# Patient Record
Sex: MALE | Race: WHITE | NOT HISPANIC OR LATINO | Employment: UNEMPLOYED | URBAN - METROPOLITAN AREA
[De-identification: names, ages, dates, MRNs, and addresses within clinical notes are randomized per-mention and may not be internally consistent; named-entity substitution may affect disease eponyms.]

---

## 2023-02-15 PROBLEM — F17.210 CIGARETTE NICOTINE DEPENDENCE WITHOUT COMPLICATION: Status: ACTIVE | Noted: 2023-02-15

## 2023-02-15 PROBLEM — R69 DIAGNOSIS UNKNOWN: Status: RESOLVED | Noted: 2023-02-15 | Resolved: 2023-02-15

## 2023-02-15 PROBLEM — E55.9 VITAMIN D DEFICIENCY: Status: ACTIVE | Noted: 2023-02-15

## 2023-02-15 PROBLEM — Z98.61 S/P PTCA (PERCUTANEOUS TRANSLUMINAL CORONARY ANGIOPLASTY): Status: ACTIVE | Noted: 2023-02-15

## 2023-02-15 PROBLEM — E11.65 TYPE 2 DIABETES MELLITUS WITH HYPERGLYCEMIA, WITHOUT LONG-TERM CURRENT USE OF INSULIN (MULTI): Status: ACTIVE | Noted: 2023-02-15

## 2023-02-15 PROBLEM — E78.5 HYPERLIPIDEMIA: Status: ACTIVE | Noted: 2023-02-15

## 2023-02-15 PROBLEM — I10 HYPERTENSION: Status: ACTIVE | Noted: 2023-02-15

## 2023-02-15 PROBLEM — E11.29 MICROALBUMINURIA DUE TO TYPE 2 DIABETES MELLITUS (MULTI): Status: ACTIVE | Noted: 2023-02-15

## 2023-02-15 PROBLEM — I25.10 CAD IN NATIVE ARTERY: Status: ACTIVE | Noted: 2023-02-15

## 2023-02-15 PROBLEM — R80.9 MICROALBUMINURIA DUE TO TYPE 2 DIABETES MELLITUS (MULTI): Status: ACTIVE | Noted: 2023-02-15

## 2023-02-15 RX ORDER — ASPIRIN 81 MG/1
1 TABLET ORAL DAILY
COMMUNITY
Start: 2019-02-22

## 2023-02-15 RX ORDER — CHOLECALCIFEROL (VITAMIN D3) 125 MCG
1 CAPSULE ORAL DAILY
COMMUNITY
Start: 2021-08-10

## 2023-02-15 RX ORDER — LISINOPRIL 10 MG/1
1 TABLET ORAL DAILY
COMMUNITY
Start: 2018-04-06 | End: 2023-09-05 | Stop reason: SDUPTHER

## 2023-02-15 RX ORDER — METFORMIN HYDROCHLORIDE 500 MG/1
1 TABLET, EXTENDED RELEASE ORAL 2 TIMES DAILY
COMMUNITY
Start: 2021-08-10 | End: 2023-06-06 | Stop reason: SDUPTHER

## 2023-02-15 RX ORDER — ROSUVASTATIN CALCIUM 40 MG/1
1 TABLET, COATED ORAL DAILY
COMMUNITY
Start: 2020-06-25 | End: 2023-07-06 | Stop reason: SDUPTHER

## 2023-02-15 RX ORDER — IBUPROFEN 200 MG
CAPSULE ORAL
COMMUNITY
Start: 2021-08-10 | End: 2023-08-07 | Stop reason: WASHOUT

## 2023-02-15 RX ORDER — METOPROLOL TARTRATE 50 MG/1
1 TABLET ORAL EVERY 12 HOURS
COMMUNITY
Start: 2018-04-06 | End: 2023-07-06 | Stop reason: SDUPTHER

## 2023-06-06 ENCOUNTER — TELEPHONE (OUTPATIENT)
Dept: PRIMARY CARE | Facility: CLINIC | Age: 58
End: 2023-06-06
Payer: COMMERCIAL

## 2023-06-06 DIAGNOSIS — E11.65 TYPE 2 DIABETES MELLITUS WITH HYPERGLYCEMIA, WITHOUT LONG-TERM CURRENT USE OF INSULIN (MULTI): Primary | ICD-10-CM

## 2023-06-06 RX ORDER — METFORMIN HYDROCHLORIDE 500 MG/1
500 TABLET, EXTENDED RELEASE ORAL 2 TIMES DAILY
Qty: 180 TABLET | Refills: 1 | Status: SHIPPED | OUTPATIENT
Start: 2023-06-06 | End: 2023-08-07 | Stop reason: SDUPTHER

## 2023-06-06 NOTE — TELEPHONE ENCOUNTER
Pt called asking for refill on metformin be sent to [facility]. Pt was upset that his pharmacy has requested this for him already and it was not done and that he had to call.

## 2023-06-07 NOTE — TELEPHONE ENCOUNTER
1) eprescribed      2) please explain to him that we do not take requests from the pharmacy because they are oftentimes inaccurate or for medications that have been stopped.

## 2023-07-06 DIAGNOSIS — E11.29 MICROALBUMINURIA DUE TO TYPE 2 DIABETES MELLITUS (MULTI): ICD-10-CM

## 2023-07-06 DIAGNOSIS — E55.9 VITAMIN D DEFICIENCY: ICD-10-CM

## 2023-07-06 DIAGNOSIS — E78.2 MIXED HYPERLIPIDEMIA: Primary | ICD-10-CM

## 2023-07-06 DIAGNOSIS — I25.10 ASHD (ARTERIOSCLEROTIC HEART DISEASE): ICD-10-CM

## 2023-07-06 DIAGNOSIS — I10 ESSENTIAL HYPERTENSION: ICD-10-CM

## 2023-07-06 DIAGNOSIS — R80.9 MICROALBUMINURIA DUE TO TYPE 2 DIABETES MELLITUS (MULTI): ICD-10-CM

## 2023-07-06 DIAGNOSIS — E11.65 TYPE 2 DIABETES MELLITUS WITH HYPERGLYCEMIA, WITHOUT LONG-TERM CURRENT USE OF INSULIN (MULTI): ICD-10-CM

## 2023-07-08 RX ORDER — ROSUVASTATIN CALCIUM 40 MG/1
40 TABLET, COATED ORAL DAILY
Qty: 90 TABLET | Refills: 0 | Status: SHIPPED | OUTPATIENT
Start: 2023-07-08 | End: 2023-10-06 | Stop reason: SDUPTHER

## 2023-07-08 RX ORDER — METOPROLOL TARTRATE 50 MG/1
50 TABLET ORAL EVERY 12 HOURS
Qty: 180 TABLET | Refills: 0 | Status: SHIPPED | OUTPATIENT
Start: 2023-07-08 | End: 2023-10-06 | Stop reason: SDUPTHER

## 2023-07-11 NOTE — TELEPHONE ENCOUNTER
Pt notified of refills, appointment made for August 7th, do you want him to have blood work for this appointment??  He will be using  labs.  Please advise so I can call him back  Last blood work was done last July (full panel blood work,) (A1C October 2022)

## 2023-07-12 PROBLEM — E78.2 MIXED HYPERLIPIDEMIA: Status: ACTIVE | Noted: 2023-02-15

## 2023-08-02 ENCOUNTER — LAB (OUTPATIENT)
Dept: LAB | Facility: LAB | Age: 58
End: 2023-08-02
Payer: COMMERCIAL

## 2023-08-02 DIAGNOSIS — E55.9 VITAMIN D DEFICIENCY: ICD-10-CM

## 2023-08-02 DIAGNOSIS — E11.29 MICROALBUMINURIA DUE TO TYPE 2 DIABETES MELLITUS (MULTI): ICD-10-CM

## 2023-08-02 DIAGNOSIS — I25.10 ASHD (ARTERIOSCLEROTIC HEART DISEASE): ICD-10-CM

## 2023-08-02 DIAGNOSIS — I10 ESSENTIAL HYPERTENSION: ICD-10-CM

## 2023-08-02 DIAGNOSIS — E11.65 TYPE 2 DIABETES MELLITUS WITH HYPERGLYCEMIA, WITHOUT LONG-TERM CURRENT USE OF INSULIN (MULTI): ICD-10-CM

## 2023-08-02 DIAGNOSIS — E78.2 MIXED HYPERLIPIDEMIA: ICD-10-CM

## 2023-08-02 DIAGNOSIS — R80.9 MICROALBUMINURIA DUE TO TYPE 2 DIABETES MELLITUS (MULTI): ICD-10-CM

## 2023-08-02 LAB
ALANINE AMINOTRANSFERASE (SGPT) (U/L) IN SER/PLAS: 39 U/L (ref 10–52)
ALBUMIN (G/DL) IN SER/PLAS: 4 G/DL (ref 3.4–5)
ALBUMIN (MG/L) IN URINE: 136.4 MG/L
ALBUMIN/CREATININE (UG/MG) IN URINE: 78.4 UG/MG CRT (ref 0–30)
ALKALINE PHOSPHATASE (U/L) IN SER/PLAS: 44 U/L (ref 33–120)
ANION GAP IN SER/PLAS: 13 MMOL/L (ref 10–20)
ASPARTATE AMINOTRANSFERASE (SGOT) (U/L) IN SER/PLAS: 27 U/L (ref 9–39)
BASOPHILS (10*3/UL) IN BLOOD BY AUTOMATED COUNT: 0.03 X10E9/L (ref 0–0.1)
BASOPHILS/100 LEUKOCYTES IN BLOOD BY AUTOMATED COUNT: 0.6 % (ref 0–2)
BILIRUBIN TOTAL (MG/DL) IN SER/PLAS: 0.9 MG/DL (ref 0–1.2)
CALCIDIOL (25 OH VITAMIN D3) (NG/ML) IN SER/PLAS: 29 NG/ML
CALCIUM (MG/DL) IN SER/PLAS: 8.9 MG/DL (ref 8.6–10.3)
CARBON DIOXIDE, TOTAL (MMOL/L) IN SER/PLAS: 27 MMOL/L (ref 21–32)
CHLORIDE (MMOL/L) IN SER/PLAS: 102 MMOL/L (ref 98–107)
CHOLESTEROL (MG/DL) IN SER/PLAS: 82 MG/DL (ref 0–199)
CHOLESTEROL IN HDL (MG/DL) IN SER/PLAS: 41.5 MG/DL
CHOLESTEROL/HDL RATIO: 2
COBALAMIN (VITAMIN B12) (PG/ML) IN SER/PLAS: 199 PG/ML (ref 211–911)
CREATININE (MG/DL) IN SER/PLAS: 1.15 MG/DL (ref 0.5–1.3)
CREATININE (MG/DL) IN URINE: 174 MG/DL (ref 20–370)
EOSINOPHILS (10*3/UL) IN BLOOD BY AUTOMATED COUNT: 0.08 X10E9/L (ref 0–0.7)
EOSINOPHILS/100 LEUKOCYTES IN BLOOD BY AUTOMATED COUNT: 1.6 % (ref 0–6)
ERYTHROCYTE DISTRIBUTION WIDTH (RATIO) BY AUTOMATED COUNT: 12.4 % (ref 11.5–14.5)
ERYTHROCYTE MEAN CORPUSCULAR HEMOGLOBIN CONCENTRATION (G/DL) BY AUTOMATED: 33.3 G/DL (ref 32–36)
ERYTHROCYTE MEAN CORPUSCULAR VOLUME (FL) BY AUTOMATED COUNT: 90 FL (ref 80–100)
ERYTHROCYTES (10*6/UL) IN BLOOD BY AUTOMATED COUNT: 4.82 X10E12/L (ref 4.5–5.9)
ESTIMATED AVERAGE GLUCOSE FOR HBA1C: 286 MG/DL
GFR MALE: 73 ML/MIN/1.73M2
GLUCOSE (MG/DL) IN SER/PLAS: 319 MG/DL (ref 74–99)
HEMATOCRIT (%) IN BLOOD BY AUTOMATED COUNT: 43.5 % (ref 41–52)
HEMOGLOBIN (G/DL) IN BLOOD: 14.5 G/DL (ref 13.5–17.5)
HEMOGLOBIN A1C/HEMOGLOBIN TOTAL IN BLOOD: 11.6 %
IMMATURE GRANULOCYTES/100 LEUKOCYTES IN BLOOD BY AUTOMATED COUNT: 0.2 % (ref 0–0.9)
LDL: 15 MG/DL (ref 0–99)
LEUKOCYTES (10*3/UL) IN BLOOD BY AUTOMATED COUNT: 5.2 X10E9/L (ref 4.4–11.3)
LYMPHOCYTES (10*3/UL) IN BLOOD BY AUTOMATED COUNT: 1.95 X10E9/L (ref 1.2–4.8)
LYMPHOCYTES/100 LEUKOCYTES IN BLOOD BY AUTOMATED COUNT: 37.9 % (ref 13–44)
MONOCYTES (10*3/UL) IN BLOOD BY AUTOMATED COUNT: 0.56 X10E9/L (ref 0.1–1)
MONOCYTES/100 LEUKOCYTES IN BLOOD BY AUTOMATED COUNT: 10.9 % (ref 2–10)
NEUTROPHILS (10*3/UL) IN BLOOD BY AUTOMATED COUNT: 2.52 X10E9/L (ref 1.2–7.7)
NEUTROPHILS/100 LEUKOCYTES IN BLOOD BY AUTOMATED COUNT: 48.8 % (ref 40–80)
PLATELETS (10*3/UL) IN BLOOD AUTOMATED COUNT: 207 X10E9/L (ref 150–450)
POTASSIUM (MMOL/L) IN SER/PLAS: 4.8 MMOL/L (ref 3.5–5.3)
PROTEIN TOTAL: 6.5 G/DL (ref 6.4–8.2)
SODIUM (MMOL/L) IN SER/PLAS: 137 MMOL/L (ref 136–145)
THYROTROPIN (MIU/L) IN SER/PLAS BY DETECTION LIMIT <= 0.05 MIU/L: 2.96 MIU/L (ref 0.44–3.98)
TRIGLYCERIDE (MG/DL) IN SER/PLAS: 130 MG/DL (ref 0–149)
UREA NITROGEN (MG/DL) IN SER/PLAS: 15 MG/DL (ref 6–23)
VLDL: 26 MG/DL (ref 0–40)

## 2023-08-02 PROCEDURE — 82607 VITAMIN B-12: CPT

## 2023-08-02 PROCEDURE — 83036 HEMOGLOBIN GLYCOSYLATED A1C: CPT

## 2023-08-02 PROCEDURE — 82570 ASSAY OF URINE CREATININE: CPT

## 2023-08-02 PROCEDURE — 36415 COLL VENOUS BLD VENIPUNCTURE: CPT

## 2023-08-02 PROCEDURE — 80061 LIPID PANEL: CPT

## 2023-08-02 PROCEDURE — 85025 COMPLETE CBC W/AUTO DIFF WBC: CPT

## 2023-08-02 PROCEDURE — 82043 UR ALBUMIN QUANTITATIVE: CPT

## 2023-08-02 PROCEDURE — 84443 ASSAY THYROID STIM HORMONE: CPT

## 2023-08-02 PROCEDURE — 80053 COMPREHEN METABOLIC PANEL: CPT

## 2023-08-02 PROCEDURE — 82306 VITAMIN D 25 HYDROXY: CPT

## 2023-08-07 ENCOUNTER — OFFICE VISIT (OUTPATIENT)
Dept: PRIMARY CARE | Facility: CLINIC | Age: 58
End: 2023-08-07
Payer: COMMERCIAL

## 2023-08-07 VITALS
BODY MASS INDEX: 42.23 KG/M2 | TEMPERATURE: 97 F | WEIGHT: 295 LBS | SYSTOLIC BLOOD PRESSURE: 134 MMHG | DIASTOLIC BLOOD PRESSURE: 80 MMHG | OXYGEN SATURATION: 92 % | RESPIRATION RATE: 16 BRPM | HEART RATE: 61 BPM | HEIGHT: 70 IN

## 2023-08-07 DIAGNOSIS — F17.211 CIGARETTE NICOTINE DEPENDENCE IN REMISSION: ICD-10-CM

## 2023-08-07 DIAGNOSIS — E55.9 VITAMIN D DEFICIENCY: ICD-10-CM

## 2023-08-07 DIAGNOSIS — I10 ESSENTIAL HYPERTENSION: ICD-10-CM

## 2023-08-07 DIAGNOSIS — E11.65 TYPE 2 DIABETES MELLITUS WITH HYPERGLYCEMIA, WITHOUT LONG-TERM CURRENT USE OF INSULIN (MULTI): Primary | ICD-10-CM

## 2023-08-07 DIAGNOSIS — E78.2 MIXED HYPERLIPIDEMIA: ICD-10-CM

## 2023-08-07 DIAGNOSIS — I25.10 ASHD (ARTERIOSCLEROTIC HEART DISEASE): ICD-10-CM

## 2023-08-07 DIAGNOSIS — E53.8 VITAMIN B12 DEFICIENCY: ICD-10-CM

## 2023-08-07 DIAGNOSIS — R80.9 MICROALBUMINURIA DUE TO TYPE 2 DIABETES MELLITUS (MULTI): ICD-10-CM

## 2023-08-07 DIAGNOSIS — E11.29 MICROALBUMINURIA DUE TO TYPE 2 DIABETES MELLITUS (MULTI): ICD-10-CM

## 2023-08-07 PROBLEM — E66.813 CLASS 3 SEVERE OBESITY DUE TO EXCESS CALORIES WITH SERIOUS COMORBIDITY AND BODY MASS INDEX (BMI) OF 40.0 TO 44.9 IN ADULT: Status: ACTIVE | Noted: 2023-08-07

## 2023-08-07 PROBLEM — E66.01 CLASS 3 SEVERE OBESITY DUE TO EXCESS CALORIES WITH SERIOUS COMORBIDITY AND BODY MASS INDEX (BMI) OF 40.0 TO 44.9 IN ADULT (MULTI): Status: ACTIVE | Noted: 2023-08-07

## 2023-08-07 PROCEDURE — 3075F SYST BP GE 130 - 139MM HG: CPT | Performed by: FAMILY MEDICINE

## 2023-08-07 PROCEDURE — 3046F HEMOGLOBIN A1C LEVEL >9.0%: CPT | Performed by: FAMILY MEDICINE

## 2023-08-07 PROCEDURE — 3079F DIAST BP 80-89 MM HG: CPT | Performed by: FAMILY MEDICINE

## 2023-08-07 PROCEDURE — 3008F BODY MASS INDEX DOCD: CPT | Performed by: FAMILY MEDICINE

## 2023-08-07 PROCEDURE — 1036F TOBACCO NON-USER: CPT | Performed by: FAMILY MEDICINE

## 2023-08-07 PROCEDURE — 99214 OFFICE O/P EST MOD 30 MIN: CPT | Performed by: FAMILY MEDICINE

## 2023-08-07 PROCEDURE — 4010F ACE/ARB THERAPY RXD/TAKEN: CPT | Performed by: FAMILY MEDICINE

## 2023-08-07 PROCEDURE — 3066F NEPHROPATHY DOC TX: CPT | Performed by: FAMILY MEDICINE

## 2023-08-07 RX ORDER — METFORMIN HYDROCHLORIDE 500 MG/1
1000 TABLET, EXTENDED RELEASE ORAL 2 TIMES DAILY
Qty: 360 TABLET | Refills: 1 | Status: SHIPPED | OUTPATIENT
Start: 2023-08-07 | End: 2023-09-05 | Stop reason: SDUPTHER

## 2023-08-07 RX ORDER — LANCETS
EACH MISCELLANEOUS
Qty: 100 EACH | Refills: 3 | Status: SHIPPED | OUTPATIENT
Start: 2023-08-07

## 2023-08-07 RX ORDER — IBUPROFEN 200 MG
CAPSULE ORAL
Qty: 100 STRIP | Refills: 3 | Status: SHIPPED | OUTPATIENT
Start: 2023-08-07

## 2023-08-07 RX ORDER — LANOLIN ALCOHOL/MO/W.PET/CERES
1000 CREAM (GRAM) TOPICAL DAILY
Qty: 90 TABLET | Refills: 3
Start: 2023-08-07 | End: 2024-08-06

## 2023-08-07 ASSESSMENT — PATIENT HEALTH QUESTIONNAIRE - PHQ9
1. LITTLE INTEREST OR PLEASURE IN DOING THINGS: NOT AT ALL
2. FEELING DOWN, DEPRESSED OR HOPELESS: NOT AT ALL
SUM OF ALL RESPONSES TO PHQ9 QUESTIONS 1 AND 2: 0

## 2023-08-07 NOTE — ASSESSMENT & PLAN NOTE
- HbA1c much worse at 11.6 (7) (7.3) (6.9) (6.9)  - current regimen: metformin 500 mg twice daily   - he admits to poor diet over the last month at least  - will increase metformin to 1000 mg twice daily   - Encouraged healthy lifestyle, including adequate exercise and high fiber, low fat and low carb diet.

## 2023-08-07 NOTE — PATIENT INSTRUCTIONS
Ayan Richardson ,    Thank you for coming in today. We at [facility] appreciate your trust in our care. If you have any questions or concerns about the care you received today, please do not hesitate to contact us at [phone].    The following instructions were discussed today:    - increase metformin to 1000 mg twice daily   - work on diet and exercise   - start vitamin B12 1000 mcg daily   - Follow up in 3 months.    - Please get blood work done 1-2 weeks prior to your next visit.

## 2023-08-07 NOTE — PROGRESS NOTES
Subjective   Patient ID: Ayan Richardson is a 57 y.o. male who presents for diabetic follow up (Needs lancets filled (has the butterfly on it)).    routine follow up. chronic issues as per assessment and plan.     HbA1c is much worse. He states his diet has been horrible. Has not been exercising. He is taking his metformin.         Lab on 08/02/2023   Component Date Value Ref Range Status    Hemoglobin A1C 08/02/2023 11.6 (A)  % Final         Diagnosis of Diabetes-Adults   Non-Diabetic: < or = 5.6%   Increased risk for developing diabetes: 5.7-6.4%   Diagnostic of diabetes: > or = 6.5%  .       Monitoring of Diabetes                Age (y)     Therapeutic Goal (%)   Adults:          >18           <7.0   Pediatrics:    13-18           <7.5                   7-12           <8.0                   0- 6            7.5-8.5   American Diabetes Association. Diabetes Care 33(S1), Jan 2010.    Estimated Average Glucose 08/02/2023 286  MG/DL Final    Vitamin B-12 08/02/2023 199 (L)  211 - 911 pg/mL Final    Vitamin D, 25-Hydroxy 08/02/2023 29 (A)  ng/mL Final    .  DEFICIENCY:         < 20   NG/ML  INSUFFICIENCY:      20-29  NG/ML  SUFFICIENCY:         NG/ML    THIS ASSAY ACCURATELY QUANTIFIES THE SUM OF  VITAMIN D3, 25-HYDROXY AND VIT D2,25-HYDROXY.    ALBUMIN (MG/L) IN URINE 08/02/2023 136.4  Not Established mg/L Final    Albumin/Creatine Ratio 08/02/2023 78.4 (H)  0.0 - 30.0 ug/mg crt Final    Creatinine, Urine 08/02/2023 174.0  20.0 - 370.0 mg/dL Final    WBC 08/02/2023 5.2  4.4 - 11.3 x10E9/L Final    RBC 08/02/2023 4.82  4.50 - 5.90 x10E12/L Final    Hemoglobin 08/02/2023 14.5  13.5 - 17.5 g/dL Final    Hematocrit 08/02/2023 43.5  41.0 - 52.0 % Final    MCV 08/02/2023 90  80 - 100 fL Final    MCHC 08/02/2023 33.3  32.0 - 36.0 g/dL Final    Platelets 08/02/2023 207  150 - 450 x10E9/L Final    RDW 08/02/2023 12.4  11.5 - 14.5 % Final    Neutrophils % 08/02/2023 48.8  40.0 - 80.0 % Final    Immature Granulocytes %,  Automated 08/02/2023 0.2  0.0 - 0.9 % Final     Immature Granulocyte Count (IG) includes promyelocytes,    myelocytes and metamyelocytes but does not include bands.   Percent differential counts (%) should be interpreted in the   context of the absolute cell counts (cells/L).    Lymphocytes % 08/02/2023 37.9  13.0 - 44.0 % Final    Monocytes % 08/02/2023 10.9  2.0 - 10.0 % Final    Eosinophils % 08/02/2023 1.6  0.0 - 6.0 % Final    Basophils % 08/02/2023 0.6  0.0 - 2.0 % Final    Neutrophils Absolute 08/02/2023 2.52  1.20 - 7.70 x10E9/L Final    Lymphocytes Absolute 08/02/2023 1.95  1.20 - 4.80 x10E9/L Final    Monocytes Absolute 08/02/2023 0.56  0.10 - 1.00 x10E9/L Final    Eosinophils Absolute 08/02/2023 0.08  0.00 - 0.70 x10E9/L Final    Basophils Absolute 08/02/2023 0.03  0.00 - 0.10 x10E9/L Final    Glucose 08/02/2023 319 (H)  74 - 99 mg/dL Final    Sodium 08/02/2023 137  136 - 145 mmol/L Final    Potassium 08/02/2023 4.8  3.5 - 5.3 mmol/L Final    Chloride 08/02/2023 102  98 - 107 mmol/L Final    Bicarbonate 08/02/2023 27  21 - 32 mmol/L Final    Anion Gap 08/02/2023 13  10 - 20 mmol/L Final    Urea Nitrogen 08/02/2023 15  6 - 23 mg/dL Final    Creatinine 08/02/2023 1.15  0.50 - 1.30 mg/dL Final    GFR MALE 08/02/2023 73  >90 mL/min/1.73m2 Final     CALCULATIONS OF ESTIMATED GFR ARE PERFORMED   USING THE 2021 CKD-EPI STUDY REFIT EQUATION   WITHOUT THE RACE VARIABLE FOR THE IDMS-TRACEABLE   CREATININE METHODS.    https://jasn.asnjournals.org/content/early/2021/09/22/ASN.8978818761    Calcium 08/02/2023 8.9  8.6 - 10.3 mg/dL Final    Albumin 08/02/2023 4.0  3.4 - 5.0 g/dL Final    Alkaline Phosphatase 08/02/2023 44  33 - 120 U/L Final    Total Protein 08/02/2023 6.5  6.4 - 8.2 g/dL Final    AST 08/02/2023 27  9 - 39 U/L Final    Total Bilirubin 08/02/2023 0.9  0.0 - 1.2 mg/dL Final    ALT (SGPT) 08/02/2023 39  10 - 52 U/L Final     Patients treated with Sulfasalazine may generate    falsely decreased results for  ALT.    Cholesterol 08/02/2023 82  0 - 199 mg/dL Final    .      AGE      DESIRABLE   BORDERLINE HIGH   HIGH     0-19 Y     0 - 169       170 - 199     >/= 200    20-24 Y     0 - 189       190 - 224     >/= 225         >24 Y     0 - 199       200 - 239     >/= 240   **All ranges are based on fasting samples. Specific   therapeutic targets will vary based on patient-specific   cardiac risk.  .   Pediatric guidelines reference:Pediatrics 2011, 128(S5).   Adult guidelines reference: NCEP ATPIII Guidelines,     QUINN 2001, 258:2486-97  .   Venipuncture immediately after or during the    administration of Metamizole may lead to falsely   low results. Testing should be performed immediately   prior to Metamizole dosing.    HDL 08/02/2023 41.5  mg/dL Final    .      AGE      VERY LOW   LOW     NORMAL    HIGH       0-19 Y       < 35   < 40     40-45     ----    20-24 Y       ----   < 40       >45     ----      >24 Y       ----   < 40     40-60      >60  .    Cholesterol/HDL Ratio 08/02/2023 2.0   Final    REF VALUES  DESIRABLE  < 3.4  HIGH RISK  > 5.0    LDL 08/02/2023 15  0 - 99 mg/dL Final    .                           NEAR      BORD      AGE      DESIRABLE  OPTIMAL    HIGH     HIGH     VERY HIGH     0-19 Y     0 - 109     ---    110-129   >/= 130     ----    20-24 Y     0 - 119     ---    120-159   >/= 160     ----      >24 Y     0 -  99   100-129  130-159   160-189     >/=190  .    VLDL 08/02/2023 26  0 - 40 mg/dL Final    Triglycerides 08/02/2023 130  0 - 149 mg/dL Final    .      AGE      DESIRABLE   BORDERLINE HIGH   HIGH     VERY HIGH   0 D-90 D    19 - 174         ----         ----        ----  91 D- 9 Y     0 -  74        75 -  99     >/= 100      ----    10-19 Y     0 -  89        90 - 129     >/= 130      ----    20-24 Y     0 - 114       115 - 149     >/= 150      ----         >24 Y     0 - 149       150 - 199    200- 499    >/= 500  .   Venipuncture immediately after or during the    administration of  "Metamizole may lead to falsely   low results. Testing should be performed immediately   prior to Metamizole dosing.    TSH 08/02/2023 2.96  0.44 - 3.98 mIU/L Final     TSH testing is performed using different testing    methodology at [facility] than at other    [facility] hospitals. Direct result comparisons should    only be made within the same method.        Review of Systems    Objective   /80   Pulse 61   Temp 36.1 °C (97 °F)   Resp 16   Ht 1.778 m (5' 10\")   Wt 134 kg (295 lb)   SpO2 92%   BMI 42.33 kg/m²     Physical Exam    Assessment/Plan   Problem List Items Addressed This Visit       ASHD (arteriosclerotic heart disease)     - follows with Dr. Li  - on metoprolol, crestor, aspirin         BMI 40.0-44.9, adult (CMS/[facility])     - Encouraged healthy lifestyle, including adequate exercise and high fiber, low fat and low carb diet.          Cigarette nicotine dependence in remission     - discussed LDCT. He wants to hold off at this time and discuss again in the future          Essential hypertension     - blood pressure elevated today on initial check but in normal range on repeat  - current regimen: metoprolol 50 mg twice daily and lisinopril 10 mg daily. Continue          Microalbuminuria due to type 2 diabetes mellitus (CMS/[facility])     - continue lisinopril          Mixed hyperlipidemia     - controlled. Continue rosuvastatin  - recheck August 2024         Type 2 diabetes mellitus with hyperglycemia, without long-term current use of insulin (CMS/[facility]) - Primary     - HbA1c much worse at 11.6 (7) (7.3) (6.9) (6.9)  - current regimen: metformin 500 mg twice daily   - he admits to poor diet over the last month at least  - will increase metformin to 1000 mg twice daily   - Encouraged healthy lifestyle, including adequate exercise and high fiber, low fat and low carb diet.          Relevant Medications    metFORMIN XR (Glucophage-XR) 500 mg 24 hr tablet    blood sugar diagnostic (Blood " Glucose Test) strip    lancets misc    Other Relevant Orders    Hemoglobin A1C    Vitamin B12 deficiency     - start vitamin B12 1000 mcg daily   - recheck levels in 3 months          Relevant Medications    cyanocobalamin (Vitamin B-12) 1,000 mcg tablet    Other Relevant Orders    Vitamin B12    Vitamin D deficiency     - vitamin D borderline at 29  - continue vitamin D 5000 international units  daily

## 2023-08-07 NOTE — ASSESSMENT & PLAN NOTE
- blood pressure elevated today on initial check but in normal range on repeat  - current regimen: metoprolol 50 mg twice daily and lisinopril 10 mg daily. Continue

## 2023-08-26 DIAGNOSIS — I10 ESSENTIAL (PRIMARY) HYPERTENSION: ICD-10-CM

## 2023-08-28 RX ORDER — LISINOPRIL 10 MG/1
10 TABLET ORAL DAILY
Qty: 90 TABLET | OUTPATIENT
Start: 2023-08-28

## 2023-09-05 DIAGNOSIS — E11.65 TYPE 2 DIABETES MELLITUS WITH HYPERGLYCEMIA, WITHOUT LONG-TERM CURRENT USE OF INSULIN (MULTI): ICD-10-CM

## 2023-09-05 DIAGNOSIS — I10 ESSENTIAL HYPERTENSION: Primary | ICD-10-CM

## 2023-09-05 RX ORDER — METFORMIN HYDROCHLORIDE 500 MG/1
1000 TABLET, EXTENDED RELEASE ORAL 2 TIMES DAILY
Qty: 360 TABLET | Refills: 1 | Status: SHIPPED | OUTPATIENT
Start: 2023-09-05 | End: 2024-05-14 | Stop reason: SDUPTHER

## 2023-09-05 RX ORDER — LISINOPRIL 10 MG/1
10 TABLET ORAL DAILY
Qty: 90 TABLET | Refills: 1 | Status: SHIPPED | OUTPATIENT
Start: 2023-09-05 | End: 2024-02-16 | Stop reason: DRUGHIGH

## 2023-10-03 DIAGNOSIS — E78.2 MIXED HYPERLIPIDEMIA: ICD-10-CM

## 2023-10-03 DIAGNOSIS — I10 ESSENTIAL HYPERTENSION: ICD-10-CM

## 2023-10-03 RX ORDER — ROSUVASTATIN CALCIUM 40 MG/1
40 TABLET, COATED ORAL DAILY
Qty: 90 TABLET | Refills: 0 | OUTPATIENT
Start: 2023-10-03

## 2023-10-03 RX ORDER — METOPROLOL TARTRATE 50 MG/1
50 TABLET ORAL EVERY 12 HOURS
Qty: 180 TABLET | Refills: 0 | OUTPATIENT
Start: 2023-10-03

## 2023-10-06 RX ORDER — METOPROLOL TARTRATE 50 MG/1
50 TABLET ORAL EVERY 12 HOURS
Qty: 180 TABLET | Refills: 1 | Status: SHIPPED | OUTPATIENT
Start: 2023-10-06 | End: 2024-04-09 | Stop reason: SDUPTHER

## 2023-10-06 RX ORDER — ROSUVASTATIN CALCIUM 40 MG/1
40 TABLET, COATED ORAL DAILY
Qty: 90 TABLET | Refills: 1 | Status: SHIPPED | OUTPATIENT
Start: 2023-10-06 | End: 2023-10-17 | Stop reason: SDUPTHER

## 2023-10-17 DIAGNOSIS — E78.2 MIXED HYPERLIPIDEMIA: ICD-10-CM

## 2023-10-17 RX ORDER — ROSUVASTATIN CALCIUM 40 MG/1
40 TABLET, COATED ORAL DAILY
Qty: 90 TABLET | Refills: 1 | Status: SHIPPED | OUTPATIENT
Start: 2023-10-17 | End: 2024-05-14 | Stop reason: SDUPTHER

## 2023-10-18 ENCOUNTER — APPOINTMENT (OUTPATIENT)
Dept: PRIMARY CARE | Facility: CLINIC | Age: 58
End: 2023-10-18
Payer: COMMERCIAL

## 2023-10-25 NOTE — PROGRESS NOTES
"Counseling:  The patient was counseled regarding diagnostic results, instructions for management, risk factor reductions, prognosis, patient and family education, impressions, risks and benefits of treatment options and importance of compliance with treatment.      Chief Complaint:   The patient presents today for annual followup of CAD.     History Of Present Illness:    Ayan Richardson is a 58 year old male patient who presents today for annual followup of CAD. His PMH is significant for CAD with h/o NSTEMI s/p PTCA/stent of Cx 04/05/2018 and s/p staged PTCA/stent of PI branch of the RCA 04/12/2018, dyslipidemia, and HTN. Over the past year, the patient states that he has done well from a cardiac standpoint. He denies any CP, chest discomfort or SOB. BP has been stable at home. EKG today is stable with no acute changes. The patient is compliant with his prescribed medications.     Last Recorded Vitals:  Vitals:    10/27/23 1135   BP: 134/90   BP Location: Left arm   Pulse: 70   Weight: 135 kg (298 lb)   Height: 1.778 m (5' 10\")       Past Surgical History:  He has a past surgical history that includes Other surgical history (07/27/2021).      Social History:  He reports that he quit smoking about 21 months ago. His smoking use included cigarettes. He has a 30.00 pack-year smoking history. He has never used smokeless tobacco. He reports current alcohol use of about 14.0 standard drinks of alcohol per week. He reports that he does not use drugs.    Family History:  Family History   Problem Relation Name Age of Onset    COPD Mother      Coronary artery disease Mother      Diabetes type II Father      Coronary artery disease Father      Lupus Daughter          Allergies:  Atorvastatin    Outpatient Medications:  Current Outpatient Medications   Medication Instructions    aspirin (Aspir-Low) 81 mg EC tablet 1 tablet, oral, Daily    blood sugar diagnostic (Blood Glucose Test) strip Check blood sugars once daily    " cholecalciferol (Vitamin D-3) 125 MCG (5000 UT) capsule 1 capsule, oral, Daily    cyanocobalamin (VITAMIN B-12) 1,000 mcg, oral, Daily    Dexcom G4 platinum transmitter device subcutaneous, As needed, Use as instructed    lancets misc Check blood sugars once daily    lisinopril 10 mg, oral, Daily    metFORMIN XR (GLUCOPHAGE-XR) 1,000 mg, oral, 2 times daily    metoprolol tartrate (LOPRESSOR) 50 mg, oral, Every 12 hours    OneTouch Delica Plus Lancet 30 gauge misc Daily,  USE TO CHECK BLOOD SUGARS ONCE DAILY<BR>    rosuvastatin (CRESTOR) 40 mg, oral, Daily     Review of Systems   All other systems reviewed and are negative.     Physical Exam:  Constitutional:       Appearance: Healthy appearance. Not in distress.   Neck:      Vascular: No JVR. JVD normal.   Pulmonary:      Effort: Pulmonary effort is normal.      Breath sounds: Normal breath sounds. No wheezing. No rhonchi. No rales.   Chest:      Chest wall: Not tender to palpatation.   Cardiovascular:      PMI at left midclavicular line. Normal rate. Regular rhythm. Normal S1. Normal S2.       Murmurs: There is no murmur.      No gallop.  No click. No rub.   Pulses:     Intact distal pulses.   Edema:     Peripheral edema absent.   Abdominal:      General: Bowel sounds are normal.      Palpations: Abdomen is soft.      Tenderness: There is no abdominal tenderness.   Musculoskeletal: Normal range of motion.         General: No tenderness. Skin:     General: Skin is warm and dry.   Neurological:      General: No focal deficit present.      Mental Status: Alert and oriented to person, place and time.          Last Labs:  CBC -  Lab Results   Component Value Date    WBC 5.2 08/02/2023    HGB 14.5 08/02/2023    HCT 43.5 08/02/2023    MCV 90 08/02/2023     08/02/2023       CMP -  Lab Results   Component Value Date    CALCIUM 8.9 08/02/2023    PROT 6.5 08/02/2023    ALBUMIN 4.0 08/02/2023    AST 27 08/02/2023    ALT 39 08/02/2023    ALKPHOS 44 08/02/2023    BILITOT  0.9 08/02/2023       LIPID PANEL -   Lab Results   Component Value Date    CHOL 82 08/02/2023    TRIG 130 08/02/2023    HDL 41.5 08/02/2023    CHHDL 2.0 08/02/2023    LDLF 15 08/02/2023    VLDL 26 08/02/2023       RENAL FUNCTION PANEL -   Lab Results   Component Value Date    GLUCOSE 319 (H) 08/02/2023     08/02/2023    K 4.8 08/02/2023     08/02/2023    CO2 27 08/02/2023    ANIONGAP 13 08/02/2023    BUN 15 08/02/2023    CREATININE 1.15 08/02/2023    GFRMALE 73 08/02/2023    CALCIUM 8.9 08/02/2023    ALBUMIN 4.0 08/02/2023        Lab Results   Component Value Date    HGBA1C 11.6 (A) 08/02/2023       Last Cardiology Tests:  07/14/2021 - Cardiac Stress Test  1. No clinical or electrocardiographic evidence for ischemia at maximal workload.  2. The adequate level of stress was achieved.     05/17/2018 - Echocardiogram   1. Normal left ventricular size and regional systolic function with an ejection fraction of 55%.  2. Dilated right ventricle with mild systolic dysfunction.   3. Trace tricuspid regurgitation.     04/05/2018 - Cardiac Catheterization (LH)  1. Double vessel coronary artery disease without proximal left anterior descending involvement.  2. The right posterolateral branch showed severe atherosclerotic disease.  3. Successful PTCA/stent of Cx.     Lab review: I have personally reviewed the laboratory result(s).    Assessment/Plan   1) CAD s/p PCI of Cx and RCA  On ASA, lisinopril 10 mg daily, metoprolol 50 mg BID, rosuvastatin 40 mg daily   Lipid panel 08/02/2023 with LDL of 15  Doing well - denies CP, chest discomfort or SOB  EKG stable     2) Smoking  Counselled about risks of smoking including worsening of CAD, HTN and Lung disease. D/w patient about options of quitting smoking including Wellbutrin, Nicotine Patch or gum and Chantix.         Scribe Attestation  By signing my name below, IMana Scribe   attest that this documentation has been prepared under the direction and in  the presence of Justin Li MD.

## 2023-10-26 PROBLEM — I25.10 CAD IN NATIVE ARTERY: Status: ACTIVE | Noted: 2023-10-26

## 2023-10-26 RX ORDER — LANCETS 33 GAUGE
EACH MISCELLANEOUS DAILY
COMMUNITY
Start: 2023-08-07

## 2023-10-27 ENCOUNTER — OFFICE VISIT (OUTPATIENT)
Dept: CARDIOLOGY | Facility: CLINIC | Age: 58
End: 2023-10-27
Payer: COMMERCIAL

## 2023-10-27 VITALS
HEIGHT: 70 IN | SYSTOLIC BLOOD PRESSURE: 134 MMHG | DIASTOLIC BLOOD PRESSURE: 90 MMHG | BODY MASS INDEX: 42.66 KG/M2 | WEIGHT: 298 LBS | HEART RATE: 70 BPM

## 2023-10-27 DIAGNOSIS — I25.10 CAD IN NATIVE ARTERY: Primary | ICD-10-CM

## 2023-10-27 PROCEDURE — 3046F HEMOGLOBIN A1C LEVEL >9.0%: CPT | Performed by: INTERNAL MEDICINE

## 2023-10-27 PROCEDURE — 1036F TOBACCO NON-USER: CPT | Performed by: INTERNAL MEDICINE

## 2023-10-27 PROCEDURE — 3080F DIAST BP >= 90 MM HG: CPT | Performed by: INTERNAL MEDICINE

## 2023-10-27 PROCEDURE — 3075F SYST BP GE 130 - 139MM HG: CPT | Performed by: INTERNAL MEDICINE

## 2023-10-27 PROCEDURE — 93000 ELECTROCARDIOGRAM COMPLETE: CPT | Performed by: INTERNAL MEDICINE

## 2023-10-27 PROCEDURE — 4010F ACE/ARB THERAPY RXD/TAKEN: CPT | Performed by: INTERNAL MEDICINE

## 2023-10-27 PROCEDURE — 3066F NEPHROPATHY DOC TX: CPT | Performed by: INTERNAL MEDICINE

## 2023-10-27 PROCEDURE — 99212 OFFICE O/P EST SF 10 MIN: CPT | Performed by: INTERNAL MEDICINE

## 2023-10-27 PROCEDURE — 3008F BODY MASS INDEX DOCD: CPT | Performed by: INTERNAL MEDICINE

## 2023-10-27 ASSESSMENT — ENCOUNTER SYMPTOMS
LOSS OF SENSATION IN FEET: 0
DEPRESSION: 0
OCCASIONAL FEELINGS OF UNSTEADINESS: 0

## 2023-10-27 NOTE — LETTER
"October 27, 2023     Patricia Roche MD  [address] [facility] [address]    Patient: Ayan Richardson   YOB: 1965   Date of Visit: 10/27/2023       Dear Dr. Patricia Roche MD:    Thank you for referring Ayan Richardson to me for evaluation. Below are my notes for this consultation.  If you have questions, please do not hesitate to call me. I look forward to following your patient along with you.       Sincerely,     Justin Li MD      CC: No Recipients  ______________________________________________________________________________________    Counseling:  The patient was counseled regarding diagnostic results, instructions for management, risk factor reductions, prognosis, patient and family education, impressions, risks and benefits of treatment options and importance of compliance with treatment.      Chief Complaint:   The patient presents today for annual followup of CAD.     History Of Present Illness:    Ayan Richardson is a 58 year old male patient who presents today for annual followup of CAD. His PMH is significant for CAD with h/o NSTEMI s/p PTCA/stent of Cx 04/05/2018 and s/p staged PTCA/stent of PI branch of the RCA 04/12/2018, dyslipidemia, and HTN. Over the past year, the patient states that he has done well from a cardiac standpoint. He denies any CP, chest discomfort or SOB. BP has been stable at home. EKG today is stable with no acute changes. The patient is compliant with his prescribed medications.     Last Recorded Vitals:  Vitals:    10/27/23 1135   BP: 134/90   BP Location: Left arm   Pulse: 70   Weight: 135 kg (298 lb)   Height: 1.778 m (5' 10\")       Past Surgical History:  He has a past surgical history that includes Other surgical history (07/27/2021).      Social History:  He reports that he quit smoking about 21 months ago. His smoking use included cigarettes. He has a 30.00 pack-year smoking history. He has never used smokeless tobacco. He reports current alcohol use of about 14.0 " standard drinks of alcohol per week. He reports that he does not use drugs.    Family History:  Family History   Problem Relation Name Age of Onset   • COPD Mother     • Coronary artery disease Mother     • Diabetes type II Father     • Coronary artery disease Father     • Lupus Daughter          Allergies:  Atorvastatin    Outpatient Medications:  Current Outpatient Medications   Medication Instructions   • aspirin (Aspir-Low) 81 mg EC tablet 1 tablet, oral, Daily   • blood sugar diagnostic (Blood Glucose Test) strip Check blood sugars once daily   • cholecalciferol (Vitamin D-3) 125 MCG (5000 UT) capsule 1 capsule, oral, Daily   • cyanocobalamin (VITAMIN B-12) 1,000 mcg, oral, Daily   • Dexcom G4 platinum transmitter device subcutaneous, As needed, Use as instructed   • lancets misc Check blood sugars once daily   • lisinopril 10 mg, oral, Daily   • metFORMIN XR (GLUCOPHAGE-XR) 1,000 mg, oral, 2 times daily   • metoprolol tartrate (LOPRESSOR) 50 mg, oral, Every 12 hours   • OneTouch Delica Plus Lancet 30 gauge misc Daily,  USE TO CHECK BLOOD SUGARS ONCE DAILY<BR>   • rosuvastatin (CRESTOR) 40 mg, oral, Daily     Review of Systems   All other systems reviewed and are negative.     Physical Exam:  Constitutional:       Appearance: Healthy appearance. Not in distress.   Neck:      Vascular: No JVR. JVD normal.   Pulmonary:      Effort: Pulmonary effort is normal.      Breath sounds: Normal breath sounds. No wheezing. No rhonchi. No rales.   Chest:      Chest wall: Not tender to palpatation.   Cardiovascular:      PMI at left midclavicular line. Normal rate. Regular rhythm. Normal S1. Normal S2.       Murmurs: There is no murmur.      No gallop.  No click. No rub.   Pulses:     Intact distal pulses.   Edema:     Peripheral edema absent.   Abdominal:      General: Bowel sounds are normal.      Palpations: Abdomen is soft.      Tenderness: There is no abdominal tenderness.   Musculoskeletal: Normal range of motion.          General: No tenderness. Skin:     General: Skin is warm and dry.   Neurological:      General: No focal deficit present.      Mental Status: Alert and oriented to person, place and time.          Last Labs:  CBC -  Lab Results   Component Value Date    WBC 5.2 08/02/2023    HGB 14.5 08/02/2023    HCT 43.5 08/02/2023    MCV 90 08/02/2023     08/02/2023       CMP -  Lab Results   Component Value Date    CALCIUM 8.9 08/02/2023    PROT 6.5 08/02/2023    ALBUMIN 4.0 08/02/2023    AST 27 08/02/2023    ALT 39 08/02/2023    ALKPHOS 44 08/02/2023    BILITOT 0.9 08/02/2023       LIPID PANEL -   Lab Results   Component Value Date    CHOL 82 08/02/2023    TRIG 130 08/02/2023    HDL 41.5 08/02/2023    CHHDL 2.0 08/02/2023    LDLF 15 08/02/2023    VLDL 26 08/02/2023       RENAL FUNCTION PANEL -   Lab Results   Component Value Date    GLUCOSE 319 (H) 08/02/2023     08/02/2023    K 4.8 08/02/2023     08/02/2023    CO2 27 08/02/2023    ANIONGAP 13 08/02/2023    BUN 15 08/02/2023    CREATININE 1.15 08/02/2023    GFRMALE 73 08/02/2023    CALCIUM 8.9 08/02/2023    ALBUMIN 4.0 08/02/2023        Lab Results   Component Value Date    HGBA1C 11.6 (A) 08/02/2023       Last Cardiology Tests:  07/14/2021 - Cardiac Stress Test  1. No clinical or electrocardiographic evidence for ischemia at maximal workload.  2. The adequate level of stress was achieved.     05/17/2018 - Echocardiogram   1. Normal left ventricular size and regional systolic function with an ejection fraction of 55%.  2. Dilated right ventricle with mild systolic dysfunction.   3. Trace tricuspid regurgitation.     04/05/2018 - Cardiac Catheterization (LH)  1. Double vessel coronary artery disease without proximal left anterior descending involvement.  2. The right posterolateral branch showed severe atherosclerotic disease.  3. Successful PTCA/stent of Cx.     Lab review: I have personally reviewed the laboratory result(s).    Assessment/Plan  1) CAD  s/p PCI of Cx and RCA  On ASA, lisinopril 10 mg daily, metoprolol 50 mg BID, rosuvastatin 40 mg daily   Lipid panel 08/02/2023 with LDL of 15  Doing well - denies CP, chest discomfort or SOB  EKG stable     2) Smoking  Counselled about risks of smoking including worsening of CAD, HTN and Lung disease. D/w patient about options of quitting smoking including Wellbutrin, Nicotine Patch or gum and Chantix.         Scribe Attestation  By signing my name below, I, Flaquita Pelayo   attest that this documentation has been prepared under the direction and in the presence of Justin Li MD.

## 2023-10-27 NOTE — PATIENT INSTRUCTIONS
Continue all current medications as prescribed. s  Followup with Dr. Li in 1 year, sooner should any issues or concerns arise before then.     If you have any questions or cardiac concerns, please call our office at [phone].

## 2023-11-03 ENCOUNTER — LAB (OUTPATIENT)
Dept: LAB | Facility: LAB | Age: 58
End: 2023-11-03
Payer: COMMERCIAL

## 2023-11-03 DIAGNOSIS — E11.65 TYPE 2 DIABETES MELLITUS WITH HYPERGLYCEMIA, WITHOUT LONG-TERM CURRENT USE OF INSULIN (MULTI): ICD-10-CM

## 2023-11-03 DIAGNOSIS — E53.8 VITAMIN B12 DEFICIENCY: ICD-10-CM

## 2023-11-03 LAB
EST. AVERAGE GLUCOSE BLD GHB EST-MCNC: 206 MG/DL
HBA1C MFR BLD: 8.8 %
VIT B12 SERPL-MCNC: 1865 PG/ML (ref 211–911)

## 2023-11-03 PROCEDURE — 36415 COLL VENOUS BLD VENIPUNCTURE: CPT

## 2023-11-03 PROCEDURE — 82607 VITAMIN B-12: CPT

## 2023-11-03 PROCEDURE — 83036 HEMOGLOBIN GLYCOSYLATED A1C: CPT

## 2023-11-08 ENCOUNTER — APPOINTMENT (OUTPATIENT)
Dept: PRIMARY CARE | Facility: CLINIC | Age: 58
End: 2023-11-08
Payer: COMMERCIAL

## 2023-11-10 ENCOUNTER — TELEMEDICINE (OUTPATIENT)
Dept: PRIMARY CARE | Facility: CLINIC | Age: 58
End: 2023-11-10
Payer: COMMERCIAL

## 2023-11-10 DIAGNOSIS — E78.2 MIXED HYPERLIPIDEMIA: ICD-10-CM

## 2023-11-10 DIAGNOSIS — E66.01 CLASS 3 SEVERE OBESITY DUE TO EXCESS CALORIES WITH SERIOUS COMORBIDITY AND BODY MASS INDEX (BMI) OF 40.0 TO 44.9 IN ADULT (MULTI): ICD-10-CM

## 2023-11-10 DIAGNOSIS — E11.65 TYPE 2 DIABETES MELLITUS WITH HYPERGLYCEMIA, WITHOUT LONG-TERM CURRENT USE OF INSULIN (MULTI): Primary | ICD-10-CM

## 2023-11-10 DIAGNOSIS — E11.29 MICROALBUMINURIA DUE TO TYPE 2 DIABETES MELLITUS (MULTI): ICD-10-CM

## 2023-11-10 DIAGNOSIS — I10 ESSENTIAL HYPERTENSION: ICD-10-CM

## 2023-11-10 DIAGNOSIS — I25.10 ASHD (ARTERIOSCLEROTIC HEART DISEASE): ICD-10-CM

## 2023-11-10 DIAGNOSIS — E53.8 VITAMIN B12 DEFICIENCY: ICD-10-CM

## 2023-11-10 DIAGNOSIS — R80.9 MICROALBUMINURIA DUE TO TYPE 2 DIABETES MELLITUS (MULTI): ICD-10-CM

## 2023-11-10 PROCEDURE — 99442 PR PHYS/QHP TELEPHONE EVALUATION 11-20 MIN: CPT | Performed by: FAMILY MEDICINE

## 2023-11-10 RX ORDER — ORAL SEMAGLUTIDE 7 MG/1
7 TABLET ORAL DAILY
Qty: 30 TABLET | Refills: 2 | Status: SHIPPED | OUTPATIENT
Start: 2023-12-08 | End: 2023-12-13 | Stop reason: SDUPTHER

## 2023-11-10 ASSESSMENT — ENCOUNTER SYMPTOMS
HEMATURIA: 0
NAUSEA: 0
CONFUSION: 0
FREQUENCY: 0
DIARRHEA: 0
UNEXPECTED WEIGHT CHANGE: 0
DYSPHORIC MOOD: 0
POLYDIPSIA: 0
CHILLS: 0
DYSURIA: 0
SORE THROAT: 0
NERVOUS/ANXIOUS: 0
ABDOMINAL PAIN: 0
SINUS PAIN: 0
SINUS PRESSURE: 0
DIAPHORESIS: 0
ADENOPATHY: 0
SHORTNESS OF BREATH: 0
FEVER: 0
LIGHT-HEADEDNESS: 0
COUGH: 0
PALPITATIONS: 0
DIZZINESS: 0
CHEST TIGHTNESS: 0
CONSTIPATION: 0
NUMBNESS: 0
POLYPHAGIA: 0
WHEEZING: 0
VOMITING: 0
HEADACHES: 0

## 2023-11-10 NOTE — ASSESSMENT & PLAN NOTE
- HbA1c improved at 8.8 (11.6) (7) (7.3) (6.9) (6.9)  - current regimen: metformin 1000 mg twice daily   - add rybelsus 3 mg x 1 month and then increase to 7 mg daily

## 2023-11-10 NOTE — PROGRESS NOTES
Subjective   Patient ID: Ayan Richardson is a 58 y.o. male who presents for Follow-up.    Virtual or Telephone Consent    A telephone visit (audio only) between the patient (at the originating site) and the provider (at the distant site) was utilized to provide this telehealth service.   Verbal consent was requested and obtained from Ayan Richardson on this date, 11/10/23 for a telehealth visit.      HPI    routine follow up. chronic issues as per assessment and plan.     Saw Dr. Li 10/27/23. Note reviewed. No changes made.     Lab on 11/03/2023   Component Date Value Ref Range Status    Hemoglobin A1C 11/03/2023 8.8 (H)  see below % Final    Estimated Average Glucose 11/03/2023 206  Not Established mg/dL Final    Vitamin B12 11/03/2023 1,865 (H)  211 - 911 pg/mL Final   ;a    Review of Systems   Constitutional:  Negative for chills, diaphoresis, fever and unexpected weight change.   HENT:  Negative for congestion, sinus pressure, sinus pain, sneezing and sore throat.    Respiratory:  Negative for cough, chest tightness, shortness of breath and wheezing.    Cardiovascular:  Negative for chest pain, palpitations and leg swelling.   Gastrointestinal:  Negative for abdominal pain, constipation, diarrhea, nausea and vomiting.   Endocrine: Negative for cold intolerance, heat intolerance, polydipsia, polyphagia and polyuria.   Genitourinary:  Negative for dysuria, frequency, hematuria and urgency.   Neurological:  Negative for dizziness, syncope, light-headedness, numbness and headaches.   Hematological:  Negative for adenopathy.   Psychiatric/Behavioral:  Negative for confusion and dysphoric mood. The patient is not nervous/anxious.          Assessment/Plan   Problem List Items Addressed This Visit       ASHD (arteriosclerotic heart disease)     - follows with Dr. Li  - on metoprolol, crestor, aspirin         BMI 40.0-44.9, adult (CMS/[facility])     - Encouraged healthy lifestyle, including adequate exercise and high fiber,  low fat and low carb diet.          Class 3 severe obesity due to excess calories with serious comorbidity and body mass index (BMI) of 40.0 to 44.9 in adult (CMS/[facility])     - Encouraged healthy lifestyle, including adequate exercise and high fiber, low fat and low carb diet.          Essential hypertension     - controlled. Continue lisinopril and metoprolol         Microalbuminuria due to type 2 diabetes mellitus (CMS/[facility])     - continue lisinopril          Mixed hyperlipidemia     - controlled. Continue rosuvastatin  - recheck August 2024         Type 2 diabetes mellitus with hyperglycemia, without long-term current use of insulin (CMS/[facility]) - Primary     - HbA1c improved at 8.8 (11.6) (7) (7.3) (6.9) (6.9)  - current regimen: metformin 1000 mg twice daily   - add rybelsus 3 mg x 1 month and then increase to 7 mg daily          Vitamin B12 deficiency     - continue vitamin B12            This telephone visit lasted approximately 11 minutes.

## 2023-11-10 NOTE — PATIENT INSTRUCTIONS
Ayan Richardson ,    Thank you for coming in today. We at [facility] appreciate your trust in our care. If you have any questions or concerns about the care you received today, please do not hesitate to contact us at [phone].    The following instructions were discussed today:    - start rybelsus 3 mg daily x 1 month and then increase to 7 mg daily   - Follow up in 3 months.    - Please get blood work done 1-2 weeks prior to your next visit.

## 2023-12-13 DIAGNOSIS — E11.65 TYPE 2 DIABETES MELLITUS WITH HYPERGLYCEMIA, WITHOUT LONG-TERM CURRENT USE OF INSULIN (MULTI): ICD-10-CM

## 2023-12-14 RX ORDER — ORAL SEMAGLUTIDE 7 MG/1
7 TABLET ORAL DAILY
Qty: 30 TABLET | Refills: 2 | Status: SHIPPED | OUTPATIENT
Start: 2023-12-14 | End: 2024-02-13 | Stop reason: DRUGHIGH

## 2024-01-04 ENCOUNTER — TELEPHONE (OUTPATIENT)
Dept: PRIMARY CARE | Facility: CLINIC | Age: 59
End: 2024-01-04
Payer: COMMERCIAL

## 2024-01-04 NOTE — TELEPHONE ENCOUNTER
Tested positive for COVID-19 today. Symptoms started 4 days ago. Only having runny nose. Discussed paxlovid. He declines. States he is starting to feel better.

## 2024-02-06 ENCOUNTER — LAB (OUTPATIENT)
Dept: LAB | Facility: LAB | Age: 59
End: 2024-02-06
Payer: COMMERCIAL

## 2024-02-06 DIAGNOSIS — E11.65 TYPE 2 DIABETES MELLITUS WITH HYPERGLYCEMIA, WITHOUT LONG-TERM CURRENT USE OF INSULIN (MULTI): ICD-10-CM

## 2024-02-06 LAB
EST. AVERAGE GLUCOSE BLD GHB EST-MCNC: 157 MG/DL
HBA1C MFR BLD: 7.1 %

## 2024-02-06 PROCEDURE — 36415 COLL VENOUS BLD VENIPUNCTURE: CPT

## 2024-02-06 PROCEDURE — 83036 HEMOGLOBIN GLYCOSYLATED A1C: CPT

## 2024-02-13 ENCOUNTER — OFFICE VISIT (OUTPATIENT)
Dept: PRIMARY CARE | Facility: CLINIC | Age: 59
End: 2024-02-13
Payer: COMMERCIAL

## 2024-02-13 VITALS
HEIGHT: 70 IN | SYSTOLIC BLOOD PRESSURE: 150 MMHG | HEART RATE: 67 BPM | WEIGHT: 286 LBS | OXYGEN SATURATION: 93 % | RESPIRATION RATE: 16 BRPM | DIASTOLIC BLOOD PRESSURE: 90 MMHG | BODY MASS INDEX: 40.94 KG/M2

## 2024-02-13 DIAGNOSIS — E55.9 VITAMIN D DEFICIENCY: ICD-10-CM

## 2024-02-13 DIAGNOSIS — R80.9 MICROALBUMINURIA DUE TO TYPE 2 DIABETES MELLITUS (MULTI): ICD-10-CM

## 2024-02-13 DIAGNOSIS — I10 ESSENTIAL HYPERTENSION: ICD-10-CM

## 2024-02-13 DIAGNOSIS — E78.2 MIXED HYPERLIPIDEMIA: ICD-10-CM

## 2024-02-13 DIAGNOSIS — E11.29 MICROALBUMINURIA DUE TO TYPE 2 DIABETES MELLITUS (MULTI): ICD-10-CM

## 2024-02-13 DIAGNOSIS — E66.01 CLASS 3 SEVERE OBESITY DUE TO EXCESS CALORIES WITH SERIOUS COMORBIDITY AND BODY MASS INDEX (BMI) OF 40.0 TO 44.9 IN ADULT (MULTI): ICD-10-CM

## 2024-02-13 DIAGNOSIS — E11.65 TYPE 2 DIABETES MELLITUS WITH HYPERGLYCEMIA, WITHOUT LONG-TERM CURRENT USE OF INSULIN (MULTI): Primary | ICD-10-CM

## 2024-02-13 DIAGNOSIS — I25.10 ASHD (ARTERIOSCLEROTIC HEART DISEASE): ICD-10-CM

## 2024-02-13 PROCEDURE — 3077F SYST BP >= 140 MM HG: CPT | Performed by: FAMILY MEDICINE

## 2024-02-13 PROCEDURE — 3051F HG A1C>EQUAL 7.0%<8.0%: CPT | Performed by: FAMILY MEDICINE

## 2024-02-13 PROCEDURE — 1036F TOBACCO NON-USER: CPT | Performed by: FAMILY MEDICINE

## 2024-02-13 PROCEDURE — 3008F BODY MASS INDEX DOCD: CPT | Performed by: FAMILY MEDICINE

## 2024-02-13 PROCEDURE — 99214 OFFICE O/P EST MOD 30 MIN: CPT | Performed by: FAMILY MEDICINE

## 2024-02-13 PROCEDURE — 3080F DIAST BP >= 90 MM HG: CPT | Performed by: FAMILY MEDICINE

## 2024-02-13 PROCEDURE — 4010F ACE/ARB THERAPY RXD/TAKEN: CPT | Performed by: FAMILY MEDICINE

## 2024-02-13 ASSESSMENT — ENCOUNTER SYMPTOMS
DIARRHEA: 0
SINUS PAIN: 0
NAUSEA: 0
UNEXPECTED WEIGHT CHANGE: 0
DYSURIA: 0
CHEST TIGHTNESS: 0
POLYPHAGIA: 0
PALPITATIONS: 0
CONSTIPATION: 0
DIZZINESS: 0
WHEEZING: 0
DIAPHORESIS: 0
SINUS PRESSURE: 0
SHORTNESS OF BREATH: 0
ADENOPATHY: 0
DYSPHORIC MOOD: 0
HEMATURIA: 0
NUMBNESS: 0
ABDOMINAL PAIN: 0
FEVER: 0
CONFUSION: 0
NERVOUS/ANXIOUS: 0
CHILLS: 0
VOMITING: 0
FREQUENCY: 0
SORE THROAT: 0
POLYDIPSIA: 0
COUGH: 0
HEADACHES: 0
LIGHT-HEADEDNESS: 0

## 2024-02-13 NOTE — PATIENT INSTRUCTIONS
Ayan Richardson ,    Thank you for coming in today. We at [facility] appreciate your trust in our care. If you have any questions or concerns about the care you received today, please do not hesitate to contact us at [phone].    The following instructions were discussed today:    - INCREASE rybelsus to 14 mg daily   - monitor blood pressure at home and call with results in 2 weeks   - Follow up in 3 months.

## 2024-02-13 NOTE — ASSESSMENT & PLAN NOTE
- blood pressure high today. Normally runs 130s/80s but today 150/90. Denies chest pain and shortness of breath.    - current regimen: lisinopril 10 mg daily, metoprolol tartate 50 mg twice daily  - will have him monitor blood pressure at home over the next 2 weeks and call with results

## 2024-02-13 NOTE — ASSESSMENT & PLAN NOTE
- HbA1c improved at 7.1 (8.8) (11.6) (7) (7.3) (6.9) (6.9)  - current regimen: metformin 1000 mg twice daily and rybelsus 7 mg daily   - INCREASE rybelsus to 14 mg daily

## 2024-02-13 NOTE — PROGRESS NOTES
"Subjective   Patient ID: Ayan Richardson is a 58 y.o. male who presents for A1C follow up.    [facility]   routine follow up. chronic issues as per assessment and plan.     Saw Dr. Li in Oct. 2023. Note reviewed. This was follow up on coronary artery disease. No changes made.     Review of Systems   Constitutional:  Negative for chills, diaphoresis, fever and unexpected weight change.   HENT:  Negative for congestion, sinus pressure, sinus pain, sneezing and sore throat.    Respiratory:  Negative for cough, chest tightness, shortness of breath and wheezing.    Cardiovascular:  Negative for chest pain, palpitations and leg swelling.   Gastrointestinal:  Negative for abdominal pain, constipation, diarrhea, nausea and vomiting.   Endocrine: Negative for cold intolerance, heat intolerance, polydipsia, polyphagia and polyuria.   Genitourinary:  Negative for dysuria, frequency, hematuria and urgency.   Neurological:  Negative for dizziness, syncope, light-headedness, numbness and headaches.   Hematological:  Negative for adenopathy.   Psychiatric/Behavioral:  Negative for confusion and dysphoric mood. The patient is not nervous/anxious.        Objective   /90 (BP Location: Left arm, Patient Position: Sitting, BP Cuff Size: Adult) Comment (BP Location): forearm  Pulse 67   Resp 16   Ht 1.778 m (5' 10\")   Wt 130 kg (286 lb)   SpO2 93%   BMI 41.04 kg/m²     Physical Exam  Vitals and nursing note reviewed.   Constitutional:       General: He is not in acute distress.     Appearance: Normal appearance.   HENT:      Head: Normocephalic and atraumatic.      Nose: Nose normal.   Eyes:      Extraocular Movements: Extraocular movements intact.      Conjunctiva/sclera: Conjunctivae normal.      Pupils: Pupils are equal, round, and reactive to light.   Cardiovascular:      Rate and Rhythm: Normal rate and regular rhythm.      Heart sounds: No murmur heard.     No friction rub. No gallop.   Pulmonary:      Effort: Pulmonary " effort is normal.      Breath sounds: Normal breath sounds. No wheezing, rhonchi or rales.   Abdominal:      General: Bowel sounds are normal. There is no distension.      Palpations: Abdomen is soft.      Tenderness: There is no abdominal tenderness.   Musculoskeletal:         General: Normal range of motion.      Cervical back: Normal range of motion and neck supple.   Skin:     General: Skin is warm and dry.   Neurological:      General: No focal deficit present.      Mental Status: He is alert and oriented to person, place, and time.      Deep Tendon Reflexes: Reflexes normal.   Psychiatric:         Mood and Affect: Mood normal.         Behavior: Behavior normal.         Thought Content: Thought content normal.         Judgment: Judgment normal.         Assessment/Plan   Problem List Items Addressed This Visit             ICD-10-CM    ASHD (arteriosclerotic heart disease) I25.10     - follows with Dr. Li  - on metoprolol, crestor, aspirin         Class 3 severe obesity due to excess calories with serious comorbidity and body mass index (BMI) of 40.0 to 44.9 in adult (CMS/HCC) E66.01, Z68.41     - Encouraged healthy lifestyle, including adequate exercise and high fiber, low fat and low carb diet.          Essential hypertension I10     - blood pressure high today. Normally runs 130s/80s but today 150/90. Denies chest pain and shortness of breath.    - current regimen: lisinopril 10 mg daily, metoprolol tartate 50 mg twice daily  - will have him monitor blood pressure at home over the next 2 weeks and call with results         Microalbuminuria due to type 2 diabetes mellitus (CMS/HCC) E11.29, R80.9     - continue lisinopril          Mixed hyperlipidemia E78.2     - controlled. Continue rosuvastatin  - recheck August 2024         Type 2 diabetes mellitus with hyperglycemia, without long-term current use of insulin (CMS/HCC) - Primary E11.65     - HbA1c improved at 7.1 (8.8) (11.6) (7) (7.3) (6.9) (6.9)  -  current regimen: metformin 1000 mg twice daily and rybelsus 7 mg daily   - INCREASE rybelsus to 14 mg daily          Relevant Medications    semaglutide (Rybelsus) 14 mg tablet tablet    Vitamin D deficiency E55.9     - vitamin D borderline at 29  - continue vitamin D 5000 international units  daily

## 2024-02-16 ENCOUNTER — TELEPHONE (OUTPATIENT)
Dept: PRIMARY CARE | Facility: CLINIC | Age: 59
End: 2024-02-16
Payer: COMMERCIAL

## 2024-02-16 DIAGNOSIS — I10 ESSENTIAL HYPERTENSION: Primary | ICD-10-CM

## 2024-02-16 RX ORDER — LISINOPRIL 20 MG/1
20 TABLET ORAL DAILY
Qty: 30 TABLET | Refills: 5 | Status: SHIPPED | OUTPATIENT
Start: 2024-02-16 | End: 2024-05-14 | Stop reason: SDUPTHER

## 2024-02-16 NOTE — TELEPHONE ENCOUNTER
1) increase lisinopril to 20 mg daily. New script sent    2) nurse visit in 1 week for blood pressure check     3) continue to monitor home blood pressure check and bring readings to nurse visit     4) BMP (blood work) in 1-2 weeks since lisinopril is being increased.

## 2024-02-16 NOTE — TELEPHONE ENCOUNTER
Bp readings have been averaging     172/102   every day    Taking lisinopril 10mg daily and metoprolol tartrate 50mg twice daily    Using [facility]

## 2024-02-19 NOTE — TELEPHONE ENCOUNTER
Pt notified on 2/16/24 and scheduled for nurse visit, forgot to document on 2.16.24 that this was done

## 2024-02-26 ENCOUNTER — LAB (OUTPATIENT)
Dept: LAB | Facility: LAB | Age: 59
End: 2024-02-26
Payer: COMMERCIAL

## 2024-02-26 DIAGNOSIS — I10 ESSENTIAL HYPERTENSION: ICD-10-CM

## 2024-02-26 LAB
ANION GAP SERPL CALC-SCNC: 11 MMOL/L (ref 10–20)
BUN SERPL-MCNC: 15 MG/DL (ref 6–23)
CALCIUM SERPL-MCNC: 9.2 MG/DL (ref 8.6–10.3)
CHLORIDE SERPL-SCNC: 103 MMOL/L (ref 98–107)
CO2 SERPL-SCNC: 28 MMOL/L (ref 21–32)
CREAT SERPL-MCNC: 0.86 MG/DL (ref 0.5–1.3)
EGFRCR SERPLBLD CKD-EPI 2021: >90 ML/MIN/1.73M*2
GLUCOSE SERPL-MCNC: 127 MG/DL (ref 74–99)
POTASSIUM SERPL-SCNC: 4.6 MMOL/L (ref 3.5–5.3)
SODIUM SERPL-SCNC: 137 MMOL/L (ref 136–145)

## 2024-02-26 PROCEDURE — 80048 BASIC METABOLIC PNL TOTAL CA: CPT

## 2024-02-26 PROCEDURE — 36415 COLL VENOUS BLD VENIPUNCTURE: CPT

## 2024-02-28 ENCOUNTER — CLINICAL SUPPORT (OUTPATIENT)
Dept: PRIMARY CARE | Facility: CLINIC | Age: 59
End: 2024-02-28
Payer: COMMERCIAL

## 2024-02-28 VITALS — DIASTOLIC BLOOD PRESSURE: 82 MMHG | HEART RATE: 76 BPM | SYSTOLIC BLOOD PRESSURE: 130 MMHG | OXYGEN SATURATION: 93 %

## 2024-02-28 DIAGNOSIS — E11.65 TYPE 2 DIABETES MELLITUS WITH HYPERGLYCEMIA, WITHOUT LONG-TERM CURRENT USE OF INSULIN (MULTI): ICD-10-CM

## 2024-02-28 DIAGNOSIS — I10 ESSENTIAL HYPERTENSION: ICD-10-CM

## 2024-02-28 RX ORDER — METFORMIN HYDROCHLORIDE 500 MG/1
500 TABLET, EXTENDED RELEASE ORAL 2 TIMES DAILY
Qty: 180 TABLET | Refills: 1 | OUTPATIENT
Start: 2024-02-28

## 2024-03-22 DIAGNOSIS — I10 ESSENTIAL HYPERTENSION: ICD-10-CM

## 2024-03-27 RX ORDER — LISINOPRIL 20 MG/1
20 TABLET ORAL DAILY
Qty: 90 TABLET | Refills: 1 | OUTPATIENT
Start: 2024-03-27

## 2024-04-08 DIAGNOSIS — I10 ESSENTIAL HYPERTENSION: ICD-10-CM

## 2024-04-08 RX ORDER — METOPROLOL TARTRATE 50 MG/1
50 TABLET ORAL EVERY 12 HOURS
Qty: 180 TABLET | Refills: 1 | OUTPATIENT
Start: 2024-04-08 | End: 2024-10-05

## 2024-04-09 DIAGNOSIS — I10 ESSENTIAL HYPERTENSION: ICD-10-CM

## 2024-04-09 RX ORDER — METOPROLOL TARTRATE 50 MG/1
50 TABLET ORAL EVERY 12 HOURS
Qty: 180 TABLET | Refills: 1 | Status: SHIPPED | OUTPATIENT
Start: 2024-04-09 | End: 2024-10-06

## 2024-05-14 ENCOUNTER — OFFICE VISIT (OUTPATIENT)
Dept: PRIMARY CARE | Facility: CLINIC | Age: 59
End: 2024-05-14
Payer: COMMERCIAL

## 2024-05-14 VITALS
WEIGHT: 292 LBS | DIASTOLIC BLOOD PRESSURE: 80 MMHG | HEART RATE: 72 BPM | SYSTOLIC BLOOD PRESSURE: 130 MMHG | OXYGEN SATURATION: 98 % | HEIGHT: 70 IN | RESPIRATION RATE: 16 BRPM | BODY MASS INDEX: 41.8 KG/M2

## 2024-05-14 DIAGNOSIS — E11.65 TYPE 2 DIABETES MELLITUS WITH HYPERGLYCEMIA, WITHOUT LONG-TERM CURRENT USE OF INSULIN (MULTI): Primary | ICD-10-CM

## 2024-05-14 DIAGNOSIS — I25.10 ASHD (ARTERIOSCLEROTIC HEART DISEASE): ICD-10-CM

## 2024-05-14 DIAGNOSIS — I10 ESSENTIAL HYPERTENSION: ICD-10-CM

## 2024-05-14 DIAGNOSIS — F17.211 CIGARETTE NICOTINE DEPENDENCE IN REMISSION: ICD-10-CM

## 2024-05-14 DIAGNOSIS — E55.9 VITAMIN D DEFICIENCY: ICD-10-CM

## 2024-05-14 DIAGNOSIS — Z11.4 ENCOUNTER FOR SCREENING FOR HIV: ICD-10-CM

## 2024-05-14 DIAGNOSIS — R80.9 MICROALBUMINURIA DUE TO TYPE 2 DIABETES MELLITUS (MULTI): ICD-10-CM

## 2024-05-14 DIAGNOSIS — E66.01 CLASS 3 SEVERE OBESITY DUE TO EXCESS CALORIES WITH SERIOUS COMORBIDITY AND BODY MASS INDEX (BMI) OF 40.0 TO 44.9 IN ADULT (MULTI): ICD-10-CM

## 2024-05-14 DIAGNOSIS — E11.29 MICROALBUMINURIA DUE TO TYPE 2 DIABETES MELLITUS (MULTI): ICD-10-CM

## 2024-05-14 DIAGNOSIS — E53.8 VITAMIN B12 DEFICIENCY: ICD-10-CM

## 2024-05-14 DIAGNOSIS — E78.2 MIXED HYPERLIPIDEMIA: ICD-10-CM

## 2024-05-14 DIAGNOSIS — Z11.59 NEED FOR HEPATITIS C SCREENING TEST: ICD-10-CM

## 2024-05-14 DIAGNOSIS — Z12.5 SCREENING FOR PROSTATE CANCER: ICD-10-CM

## 2024-05-14 LAB — POC HEMOGLOBIN A1C: 7.1 % (ref 4.2–6.5)

## 2024-05-14 PROCEDURE — 99214 OFFICE O/P EST MOD 30 MIN: CPT | Performed by: FAMILY MEDICINE

## 2024-05-14 PROCEDURE — 1036F TOBACCO NON-USER: CPT | Performed by: FAMILY MEDICINE

## 2024-05-14 PROCEDURE — 3079F DIAST BP 80-89 MM HG: CPT | Performed by: FAMILY MEDICINE

## 2024-05-14 PROCEDURE — 3075F SYST BP GE 130 - 139MM HG: CPT | Performed by: FAMILY MEDICINE

## 2024-05-14 PROCEDURE — 3008F BODY MASS INDEX DOCD: CPT | Performed by: FAMILY MEDICINE

## 2024-05-14 PROCEDURE — 3051F HG A1C>EQUAL 7.0%<8.0%: CPT | Performed by: FAMILY MEDICINE

## 2024-05-14 PROCEDURE — 4010F ACE/ARB THERAPY RXD/TAKEN: CPT | Performed by: FAMILY MEDICINE

## 2024-05-14 PROCEDURE — 83036 HEMOGLOBIN GLYCOSYLATED A1C: CPT | Performed by: FAMILY MEDICINE

## 2024-05-14 RX ORDER — LISINOPRIL 20 MG/1
20 TABLET ORAL DAILY
Qty: 90 TABLET | Refills: 1 | Status: SHIPPED | OUTPATIENT
Start: 2024-05-14 | End: 2024-11-10

## 2024-05-14 RX ORDER — METFORMIN HYDROCHLORIDE 500 MG/1
1000 TABLET, EXTENDED RELEASE ORAL 2 TIMES DAILY
Qty: 360 TABLET | Refills: 1 | Status: SHIPPED | OUTPATIENT
Start: 2024-05-14 | End: 2024-11-10

## 2024-05-14 RX ORDER — ROSUVASTATIN CALCIUM 40 MG/1
40 TABLET, COATED ORAL DAILY
Qty: 90 TABLET | Refills: 1 | Status: SHIPPED | OUTPATIENT
Start: 2024-05-14 | End: 2024-11-10

## 2024-05-14 ASSESSMENT — ENCOUNTER SYMPTOMS
SINUS PRESSURE: 0
CONFUSION: 0
DIZZINESS: 0
LIGHT-HEADEDNESS: 0
CHEST TIGHTNESS: 0
SHORTNESS OF BREATH: 0
DYSPHORIC MOOD: 0
COUGH: 0
PALPITATIONS: 0
DIAPHORESIS: 0
HEMATURIA: 0
DIARRHEA: 0
CONSTIPATION: 0
DYSURIA: 0
FEVER: 0
FREQUENCY: 0
SORE THROAT: 0
SINUS PAIN: 0
UNEXPECTED WEIGHT CHANGE: 0
POLYPHAGIA: 0
CHILLS: 0
NUMBNESS: 0
NERVOUS/ANXIOUS: 0
HEADACHES: 0
ABDOMINAL PAIN: 0
ADENOPATHY: 0
POLYDIPSIA: 0
NAUSEA: 0
WHEEZING: 0
VOMITING: 0

## 2024-05-14 NOTE — ASSESSMENT & PLAN NOTE
- HbA1c  7.1 (8.8) (11.6) (7) (7.3) (6.9) (6.9)  - current regimen: metformin 1000 mg twice daily and rybelsus 14 mg daily

## 2024-05-14 NOTE — PATIENT INSTRUCTIONS
Ayan Richardson ,    Thank you for coming in today. We at [facility] appreciate your trust in our care. If you have any questions or concerns about the care you received today, please do not hesitate to contact us at [phone].    The following instructions were discussed today:    - Follow up in 3 months.    - Please get blood work done 1-2 weeks prior to your next visit. For blood work: Nothing to eat or drink for at least 10 hours prior. Okay for water or black coffee.

## 2024-05-14 NOTE — PROGRESS NOTES
"  Subjective   Patient ID: Ayan Richardson is a 58 y.o. male who presents for A1C to be done in office.    HPI   routine follow up. chronic issues as per assessment and plan.     Review of Systems   Constitutional:  Negative for chills, diaphoresis, fever and unexpected weight change.   HENT:  Negative for congestion, sinus pressure, sinus pain, sneezing and sore throat.    Respiratory:  Negative for cough, chest tightness, shortness of breath and wheezing.    Cardiovascular:  Negative for chest pain, palpitations and leg swelling.   Gastrointestinal:  Negative for abdominal pain, constipation, diarrhea, nausea and vomiting.   Endocrine: Negative for cold intolerance, heat intolerance, polydipsia, polyphagia and polyuria.   Genitourinary:  Negative for dysuria, frequency, hematuria and urgency.   Neurological:  Negative for dizziness, syncope, light-headedness, numbness and headaches.   Hematological:  Negative for adenopathy.   Psychiatric/Behavioral:  Negative for confusion and dysphoric mood. The patient is not nervous/anxious.        Objective   /80 (BP Location: Left arm, Patient Position: Sitting, BP Cuff Size: Adult) Comment (BP Location): forearm  Pulse 72   Resp 16   Ht 1.778 m (5' 10\")   Wt 132 kg (292 lb)   SpO2 98%   BMI 41.90 kg/m²     Physical Exam  Vitals and nursing note reviewed.   Constitutional:       General: He is not in acute distress.     Appearance: Normal appearance.   HENT:      Head: Normocephalic and atraumatic.      Nose: Nose normal.   Eyes:      Extraocular Movements: Extraocular movements intact.      Conjunctiva/sclera: Conjunctivae normal.      Pupils: Pupils are equal, round, and reactive to light.   Cardiovascular:      Rate and Rhythm: Normal rate and regular rhythm.      Heart sounds: No murmur heard.     No friction rub. No gallop.   Pulmonary:      Effort: Pulmonary effort is normal.      Breath sounds: Normal breath sounds. No wheezing, rhonchi or rales.   Abdominal: "      General: Bowel sounds are normal. There is no distension.      Palpations: Abdomen is soft.      Tenderness: There is no abdominal tenderness.   Musculoskeletal:         General: Normal range of motion.      Cervical back: Normal range of motion and neck supple.   Skin:     General: Skin is warm and dry.   Neurological:      General: No focal deficit present.      Mental Status: He is alert and oriented to person, place, and time.      Deep Tendon Reflexes: Reflexes normal.   Psychiatric:         Mood and Affect: Mood normal.         Behavior: Behavior normal.         Thought Content: Thought content normal.         Judgment: Judgment normal.         Assessment/Plan   Problem List Items Addressed This Visit             ICD-10-CM    ASHD (arteriosclerotic heart disease) I25.10     - follows with Dr. Li  - on metoprolol, crestor, aspirin         BMI 40.0-44.9, adult (Multi) Z68.41     - Encouraged healthy lifestyle, including adequate exercise and high fiber, low fat and low carb diet.          Cigarette nicotine dependence in remission F17.211     - discussed LDCT. He wants to hold off at this time and discuss again in the future          Class 3 severe obesity due to excess calories with serious comorbidity and body mass index (BMI) of 40.0 to 44.9 in adult (Multi) E66.01, Z68.41     - Encouraged healthy lifestyle, including adequate exercise and high fiber, low fat and low carb diet.          Essential hypertension I10     - controlled   - current regimen: lisinopril 20 mg daily, metoprolol tartate 50 mg twice daily           Relevant Medications    lisinopril 20 mg tablet    Other Relevant Orders    Comprehensive Metabolic Panel    TSH with reflex to Free T4 if abnormal    CBC and Auto Differential    Microalbuminuria due to type 2 diabetes mellitus (Multi) E11.29, R80.9     - continue lisinopril          Mixed hyperlipidemia E78.2     - controlled. Continue rosuvastatin  - recheck August 2024          Relevant Medications    rosuvastatin (Crestor) 40 mg tablet    Other Relevant Orders    Comprehensive Metabolic Panel    Lipid Panel    TSH with reflex to Free T4 if abnormal    CBC and Auto Differential    Type 2 diabetes mellitus with hyperglycemia, without long-term current use of insulin (Multi) - Primary E11.65     - HbA1c  7.1 (8.8) (11.6) (7) (7.3) (6.9) (6.9)  - current regimen: metformin 1000 mg twice daily and rybelsus 14 mg daily            Relevant Medications    metFORMIN  mg 24 hr tablet    semaglutide (Rybelsus) 14 mg tablet tablet    Other Relevant Orders    POCT glycosylated hemoglobin (Hb A1C) manually resulted (Completed)    Hemoglobin A1C    Albumin , Urine Random    Comprehensive Metabolic Panel    TSH with reflex to Free T4 if abnormal    CBC and Auto Differential    Vitamin B12 deficiency E53.8     - continue vitamin B12         Relevant Orders    Vitamin B12    Vitamin D deficiency E55.9     - continue vitamin D 5000 international units  daily          Relevant Orders    Vitamin D 25-Hydroxy,Total (for eval of Vitamin D levels)     Other Visit Diagnoses         Codes    Encounter for screening for HIV     Z11.4    Relevant Orders    HIV 1/2 Antigen/Antibody Screen with Reflex to Confirmation    Need for hepatitis C screening test     Z11.59    Relevant Orders    Hepatitis C Antibody    Screening for prostate cancer     Z12.5    Relevant Orders    Prostate Specific Antigen, Screen

## 2024-05-14 NOTE — ASSESSMENT & PLAN NOTE
- controlled   - current regimen: lisinopril 20 mg daily, metoprolol tartate 50 mg twice daily     Spoke to patient. Patient is concerned because she has a lab appointment tomorrow and is worried that her labs will be affected by the steroid dose pack that she started yesterday. Patient wants to know if she should wait until after is finished with the sterios pack to get her labs. She will finish on the 19th. She wants wants to makes sure Dr. Gee has everything ordered that he wants her to get. Please advise.

## 2024-08-24 ENCOUNTER — LAB (OUTPATIENT)
Dept: LAB | Facility: LAB | Age: 59
End: 2024-08-24
Payer: COMMERCIAL

## 2024-08-24 DIAGNOSIS — E11.65 TYPE 2 DIABETES MELLITUS WITH HYPERGLYCEMIA, WITHOUT LONG-TERM CURRENT USE OF INSULIN (MULTI): ICD-10-CM

## 2024-08-24 DIAGNOSIS — E53.8 VITAMIN B12 DEFICIENCY: ICD-10-CM

## 2024-08-24 DIAGNOSIS — I10 ESSENTIAL HYPERTENSION: ICD-10-CM

## 2024-08-24 DIAGNOSIS — E78.2 MIXED HYPERLIPIDEMIA: ICD-10-CM

## 2024-08-24 DIAGNOSIS — Z12.5 SCREENING FOR PROSTATE CANCER: ICD-10-CM

## 2024-08-24 DIAGNOSIS — Z11.4 ENCOUNTER FOR SCREENING FOR HIV: ICD-10-CM

## 2024-08-24 DIAGNOSIS — Z11.59 NEED FOR HEPATITIS C SCREENING TEST: ICD-10-CM

## 2024-08-24 DIAGNOSIS — E55.9 VITAMIN D DEFICIENCY: ICD-10-CM

## 2024-08-24 LAB
25(OH)D3 SERPL-MCNC: 28 NG/ML (ref 30–100)
ALBUMIN SERPL BCP-MCNC: 4.1 G/DL (ref 3.4–5)
ALP SERPL-CCNC: 36 U/L (ref 33–120)
ALT SERPL W P-5'-P-CCNC: 62 U/L (ref 10–52)
ANION GAP SERPL CALC-SCNC: 12 MMOL/L (ref 10–20)
AST SERPL W P-5'-P-CCNC: 61 U/L (ref 9–39)
BASOPHILS # BLD AUTO: 0.02 X10*3/UL (ref 0–0.1)
BASOPHILS NFR BLD AUTO: 0.4 %
BILIRUB SERPL-MCNC: 0.7 MG/DL (ref 0–1.2)
BUN SERPL-MCNC: 12 MG/DL (ref 6–23)
CALCIUM SERPL-MCNC: 9.2 MG/DL (ref 8.6–10.3)
CHLORIDE SERPL-SCNC: 105 MMOL/L (ref 98–107)
CHOLEST SERPL-MCNC: 95 MG/DL (ref 0–199)
CHOLESTEROL/HDL RATIO: 2.3
CO2 SERPL-SCNC: 27 MMOL/L (ref 21–32)
CREAT SERPL-MCNC: 0.98 MG/DL (ref 0.5–1.3)
CREAT UR-MCNC: 157.8 MG/DL (ref 20–370)
EGFRCR SERPLBLD CKD-EPI 2021: 89 ML/MIN/1.73M*2
EOSINOPHIL # BLD AUTO: 0.15 X10*3/UL (ref 0–0.7)
EOSINOPHIL NFR BLD AUTO: 3.2 %
ERYTHROCYTE [DISTWIDTH] IN BLOOD BY AUTOMATED COUNT: 12.7 % (ref 11.5–14.5)
EST. AVERAGE GLUCOSE BLD GHB EST-MCNC: 171 MG/DL
GLUCOSE SERPL-MCNC: 180 MG/DL (ref 74–99)
HBA1C MFR BLD: 7.6 %
HCT VFR BLD AUTO: 42.1 % (ref 41–52)
HCV AB SER QL: NONREACTIVE
HDLC SERPL-MCNC: 41.9 MG/DL
HGB BLD-MCNC: 14.1 G/DL (ref 13.5–17.5)
HIV 1+2 AB+HIV1 P24 AG SERPL QL IA: NONREACTIVE
IMM GRANULOCYTES # BLD AUTO: 0.01 X10*3/UL (ref 0–0.7)
IMM GRANULOCYTES NFR BLD AUTO: 0.2 % (ref 0–0.9)
LDLC SERPL CALC-MCNC: 30 MG/DL
LYMPHOCYTES # BLD AUTO: 1.45 X10*3/UL (ref 1.2–4.8)
LYMPHOCYTES NFR BLD AUTO: 31 %
MCH RBC QN AUTO: 30.7 PG (ref 26–34)
MCHC RBC AUTO-ENTMCNC: 33.5 G/DL (ref 32–36)
MCV RBC AUTO: 92 FL (ref 80–100)
MICROALBUMIN UR-MCNC: 25.1 MG/L
MICROALBUMIN/CREAT UR: 15.9 UG/MG CREAT
MONOCYTES # BLD AUTO: 0.48 X10*3/UL (ref 0.1–1)
MONOCYTES NFR BLD AUTO: 10.3 %
NEUTROPHILS # BLD AUTO: 2.57 X10*3/UL (ref 1.2–7.7)
NEUTROPHILS NFR BLD AUTO: 54.9 %
NON HDL CHOLESTEROL: 53 MG/DL (ref 0–149)
NRBC BLD-RTO: 0 /100 WBCS (ref 0–0)
PLATELET # BLD AUTO: 226 X10*3/UL (ref 150–450)
POTASSIUM SERPL-SCNC: 4.6 MMOL/L (ref 3.5–5.3)
PROT SERPL-MCNC: 6.5 G/DL (ref 6.4–8.2)
PSA SERPL-MCNC: 0.32 NG/ML
RBC # BLD AUTO: 4.6 X10*6/UL (ref 4.5–5.9)
SODIUM SERPL-SCNC: 139 MMOL/L (ref 136–145)
TRIGL SERPL-MCNC: 117 MG/DL (ref 0–149)
TSH SERPL-ACNC: 2.44 MIU/L (ref 0.44–3.98)
VIT B12 SERPL-MCNC: 1188 PG/ML (ref 211–911)
VLDL: 23 MG/DL (ref 0–40)
WBC # BLD AUTO: 4.7 X10*3/UL (ref 4.4–11.3)

## 2024-08-24 PROCEDURE — 86803 HEPATITIS C AB TEST: CPT

## 2024-08-24 PROCEDURE — 87389 HIV-1 AG W/HIV-1&-2 AB AG IA: CPT

## 2024-08-24 PROCEDURE — 83036 HEMOGLOBIN GLYCOSYLATED A1C: CPT

## 2024-08-24 PROCEDURE — 80053 COMPREHEN METABOLIC PANEL: CPT

## 2024-08-24 PROCEDURE — 36415 COLL VENOUS BLD VENIPUNCTURE: CPT

## 2024-08-24 PROCEDURE — 80061 LIPID PANEL: CPT

## 2024-08-24 PROCEDURE — 82607 VITAMIN B-12: CPT

## 2024-08-24 PROCEDURE — 82570 ASSAY OF URINE CREATININE: CPT

## 2024-08-24 PROCEDURE — 85025 COMPLETE CBC W/AUTO DIFF WBC: CPT

## 2024-08-24 PROCEDURE — 82043 UR ALBUMIN QUANTITATIVE: CPT

## 2024-08-24 PROCEDURE — 84443 ASSAY THYROID STIM HORMONE: CPT

## 2024-08-24 PROCEDURE — 84153 ASSAY OF PSA TOTAL: CPT

## 2024-08-24 PROCEDURE — 82306 VITAMIN D 25 HYDROXY: CPT

## 2024-08-26 ENCOUNTER — APPOINTMENT (OUTPATIENT)
Dept: PRIMARY CARE | Facility: CLINIC | Age: 59
End: 2024-08-26
Payer: COMMERCIAL

## 2024-08-26 VITALS
HEART RATE: 69 BPM | SYSTOLIC BLOOD PRESSURE: 133 MMHG | OXYGEN SATURATION: 94 % | WEIGHT: 286 LBS | DIASTOLIC BLOOD PRESSURE: 84 MMHG | BODY MASS INDEX: 40.94 KG/M2 | RESPIRATION RATE: 18 BRPM | HEIGHT: 70 IN

## 2024-08-26 DIAGNOSIS — E78.2 MIXED HYPERLIPIDEMIA: ICD-10-CM

## 2024-08-26 DIAGNOSIS — E55.9 VITAMIN D DEFICIENCY: ICD-10-CM

## 2024-08-26 DIAGNOSIS — E66.01 CLASS 3 SEVERE OBESITY DUE TO EXCESS CALORIES WITH SERIOUS COMORBIDITY AND BODY MASS INDEX (BMI) OF 40.0 TO 44.9 IN ADULT (MULTI): ICD-10-CM

## 2024-08-26 DIAGNOSIS — I10 ESSENTIAL HYPERTENSION: ICD-10-CM

## 2024-08-26 DIAGNOSIS — R94.5 ABNORMAL LIVER FUNCTION: ICD-10-CM

## 2024-08-26 DIAGNOSIS — R80.9 MICROALBUMINURIA DUE TO TYPE 2 DIABETES MELLITUS (MULTI): ICD-10-CM

## 2024-08-26 DIAGNOSIS — I25.10 ASHD (ARTERIOSCLEROTIC HEART DISEASE): ICD-10-CM

## 2024-08-26 DIAGNOSIS — Z23 ENCOUNTER FOR IMMUNIZATION: ICD-10-CM

## 2024-08-26 DIAGNOSIS — E53.8 VITAMIN B12 DEFICIENCY: ICD-10-CM

## 2024-08-26 DIAGNOSIS — E11.65 TYPE 2 DIABETES MELLITUS WITH HYPERGLYCEMIA, WITHOUT LONG-TERM CURRENT USE OF INSULIN (MULTI): Primary | ICD-10-CM

## 2024-08-26 DIAGNOSIS — E11.29 MICROALBUMINURIA DUE TO TYPE 2 DIABETES MELLITUS (MULTI): ICD-10-CM

## 2024-08-26 PROCEDURE — 4010F ACE/ARB THERAPY RXD/TAKEN: CPT | Performed by: FAMILY MEDICINE

## 2024-08-26 PROCEDURE — 3048F LDL-C <100 MG/DL: CPT | Performed by: FAMILY MEDICINE

## 2024-08-26 PROCEDURE — 3051F HG A1C>EQUAL 7.0%<8.0%: CPT | Performed by: FAMILY MEDICINE

## 2024-08-26 PROCEDURE — 3075F SYST BP GE 130 - 139MM HG: CPT | Performed by: FAMILY MEDICINE

## 2024-08-26 PROCEDURE — 3061F NEG MICROALBUMINURIA REV: CPT | Performed by: FAMILY MEDICINE

## 2024-08-26 PROCEDURE — 99214 OFFICE O/P EST MOD 30 MIN: CPT | Performed by: FAMILY MEDICINE

## 2024-08-26 PROCEDURE — 3008F BODY MASS INDEX DOCD: CPT | Performed by: FAMILY MEDICINE

## 2024-08-26 PROCEDURE — 1036F TOBACCO NON-USER: CPT | Performed by: FAMILY MEDICINE

## 2024-08-26 PROCEDURE — 3079F DIAST BP 80-89 MM HG: CPT | Performed by: FAMILY MEDICINE

## 2024-08-26 RX ORDER — DAPAGLIFLOZIN 5 MG/1
5 TABLET, FILM COATED ORAL DAILY
Qty: 90 TABLET | Refills: 1 | Status: SHIPPED | OUTPATIENT
Start: 2024-08-26 | End: 2025-02-22

## 2024-08-26 RX ORDER — ERGOCALCIFEROL 1.25 MG/1
1.25 CAPSULE ORAL
Qty: 12 CAPSULE | Refills: 1 | Status: SHIPPED | OUTPATIENT
Start: 2024-09-01 | End: 2025-02-28

## 2024-08-26 ASSESSMENT — ENCOUNTER SYMPTOMS
CONSTIPATION: 0
DIAPHORESIS: 0
HEMATURIA: 0
CHEST TIGHTNESS: 0
COUGH: 0
POLYDIPSIA: 0
HEADACHES: 0
WHEEZING: 0
ADENOPATHY: 0
POLYPHAGIA: 0
NERVOUS/ANXIOUS: 0
NUMBNESS: 0
SINUS PRESSURE: 0
LIGHT-HEADEDNESS: 0
CONFUSION: 0
DYSURIA: 0
UNEXPECTED WEIGHT CHANGE: 0
SORE THROAT: 0
VOMITING: 0
CHILLS: 0
PALPITATIONS: 0
DYSPHORIC MOOD: 0
FREQUENCY: 0
DIZZINESS: 0
FEVER: 0
SHORTNESS OF BREATH: 0
ABDOMINAL PAIN: 0
NAUSEA: 0
DIARRHEA: 0
SINUS PAIN: 0

## 2024-08-26 ASSESSMENT — PATIENT HEALTH QUESTIONNAIRE - PHQ9
2. FEELING DOWN, DEPRESSED OR HOPELESS: NOT AT ALL
SUM OF ALL RESPONSES TO PHQ9 QUESTIONS 1 AND 2: 0
1. LITTLE INTEREST OR PLEASURE IN DOING THINGS: NOT AT ALL

## 2024-08-26 NOTE — ASSESSMENT & PLAN NOTE
- HbA1c worse at 7.6 (7.1) (8.8) (11.6) (7) (7.3) (6.9) (6.9)  - current regimen: metformin 1000 mg twice daily and rybelsus 14 mg daily

## 2024-08-26 NOTE — ASSESSMENT & PLAN NOTE
- discussed LDCT. He wants to hold off at this time and discuss again in the future. He would like to check insurance coverage first

## 2024-08-26 NOTE — PATIENT INSTRUCTIONS
Ayan Richardson ,    Thank you for coming in today. We at [facility] appreciate your trust in our care. If you have any questions or concerns about the care you received today, please do not hesitate to contact us at [phone].    The following instructions were discussed today:    - get repeat labs for your liver in about 1 month  - STOP over the counter vitamin D  - START vitamin D 50,000 IU weekly   - START farxiga 5 mg daily   - recommended the following vaccines at the pharmacy: Shingrix, Tdap, COVID-19  - Follow up in 3 months.

## 2024-08-26 NOTE — ASSESSMENT & PLAN NOTE
- AST high at 61 (27) (17)   - ALT 62 (39) (25)  - repeat LFTs and add hepatitis panel  - drinks alcohol --> 2 drinks per day   - denies acetaminophen use   - he is on rosuvastatin, but has been on for years without elevation in LFTs  - recheck LFTs in about 1 month

## 2024-08-26 NOTE — PROGRESS NOTES
Subjective   Patient ID: Ayan Richardson is a 58 y.o. male who presents for Follow-up.    HPI   routine follow up. chronic issues as per assessment and plan.     Labs from 8/24/24 reviewed with patient and noted below.     Lab on 08/24/2024   Component Date Value Ref Range Status    Vitamin D, 25-Hydroxy, Total 08/24/2024 28 (L)  30 - 100 ng/mL Final    Vitamin B12 08/24/2024 1,188 (H)  211 - 911 pg/mL Final    HIV 1/2 Antigen/Antibody Screen wi* 08/24/2024 Nonreactive  Nonreactive Final    Hepatitis C AB 08/24/2024 Nonreactive  Nonreactive Final    Results from patients taking biotin supplements or receiving high-dose biotin therapy should be interpreted with caution due to possible interference with this test. Providers may contact their local laboratory for further information.    Hemoglobin A1C 08/24/2024 7.6 (H)  see below % Final    Estimated Average Glucose 08/24/2024 171  Not Established mg/dL Final    Glucose 08/24/2024 180 (H)  74 - 99 mg/dL Final    Sodium 08/24/2024 139  136 - 145 mmol/L Final    Potassium 08/24/2024 4.6  3.5 - 5.3 mmol/L Final    Chloride 08/24/2024 105  98 - 107 mmol/L Final    Bicarbonate 08/24/2024 27  21 - 32 mmol/L Final    Anion Gap 08/24/2024 12  10 - 20 mmol/L Final    Urea Nitrogen 08/24/2024 12  6 - 23 mg/dL Final    Creatinine 08/24/2024 0.98  0.50 - 1.30 mg/dL Final    eGFR 08/24/2024 89  >60 mL/min/1.73m*2 Final    Calculations of estimated GFR are performed using the 2021 CKD-EPI Study Refit equation without the race variable for the IDMS-Traceable creatinine methods.  https://jasn.asnjournals.org/content/early/2021/09/22/ASN.5488312210    Calcium 08/24/2024 9.2  8.6 - 10.3 mg/dL Final    Albumin 08/24/2024 4.1  3.4 - 5.0 g/dL Final    Alkaline Phosphatase 08/24/2024 36  33 - 120 U/L Final    Total Protein 08/24/2024 6.5  6.4 - 8.2 g/dL Final    AST 08/24/2024 61 (H)  9 - 39 U/L Final    Bilirubin, Total 08/24/2024 0.7  0.0 - 1.2 mg/dL Final    ALT 08/24/2024 62 (H)  10 - 52  U/L Final    Patients treated with Sulfasalazine may generate falsely decreased results for ALT.    Cholesterol 08/24/2024 95  0 - 199 mg/dL Final          Age      Desirable   Borderline High   High     0-19 Y     0 - 169       170 - 199     >/= 200    20-24 Y     0 - 189       190 - 224     >/= 225         >24 Y     0 - 199       200 - 239     >/= 240   **All ranges are based on fasting samples. Specific   therapeutic targets will vary based on patient-specific   cardiac risk.    Pediatric guidelines reference:Pediatrics 2011, 128(S5).Adult guidelines reference: NCEP ATPIII Guidelines,QUINN 2001, 258:2486-97    Venipuncture immediately after or during the administration of Metamizole may lead to falsely low results. Testing should be performed immediately prior to Metamizole dosing.    HDL-Cholesterol 08/24/2024 41.9  mg/dL Final      Age       Very Low   Low     Normal    High    0-19 Y    < 35      < 40     40-45     ----  20-24 Y    ----     < 40      >45      ----        >24 Y      ----     < 40     40-60      >60      Cholesterol/HDL Ratio 08/24/2024 2.3   Final      Ref Values  Desirable  < 3.4  High Risk  > 5.0    LDL Calculated 08/24/2024 30  <=99 mg/dL Final                                Near   Borderline      AGE      Desirable  Optimal    High     High     Very High     0-19 Y     0 - 109     ---    110-129   >/= 130     ----    20-24 Y     0 - 119     ---    120-159   >/= 160     ----      >24 Y     0 -  99   100-129  130-159   160-189     >/=190      VLDL 08/24/2024 23  0 - 40 mg/dL Final    Triglycerides 08/24/2024 117  0 - 149 mg/dL Final       Age         Desirable   Borderline High   High     Very High   0 D-90 D    19 - 174         ----         ----        ----  91 D- 9 Y     0 -  74        75 -  99     >/= 100      ----    10-19 Y     0 -  89        90 - 129     >/= 130      ----    20-24 Y     0 - 114       115 - 149     >/= 150      ----         >24 Y     0 - 149       150 - 199    200- 499     >/= 500    Venipuncture immediately after or during the administration of Metamizole may lead to falsely low results. Testing should be performed immediately prior to Metamizole dosing.    Non HDL Cholesterol 08/24/2024 53  0 - 149 mg/dL Final          Age       Desirable   Borderline High   High     Very High     0-19 Y     0 - 119       120 - 144     >/= 145    >/= 160    20-24 Y     0 - 149       150 - 189     >/= 190      ----         >24 Y    30 mg/dL above LDL Cholesterol goal      Thyroid Stimulating Hormone 08/24/2024 2.44  0.44 - 3.98 mIU/L Final    WBC 08/24/2024 4.7  4.4 - 11.3 x10*3/uL Final    nRBC 08/24/2024 0.0  0.0 - 0.0 /100 WBCs Final    RBC 08/24/2024 4.60  4.50 - 5.90 x10*6/uL Final    Hemoglobin 08/24/2024 14.1  13.5 - 17.5 g/dL Final    Hematocrit 08/24/2024 42.1  41.0 - 52.0 % Final    MCV 08/24/2024 92  80 - 100 fL Final    MCH 08/24/2024 30.7  26.0 - 34.0 pg Final    MCHC 08/24/2024 33.5  32.0 - 36.0 g/dL Final    RDW 08/24/2024 12.7  11.5 - 14.5 % Final    Platelets 08/24/2024 226  150 - 450 x10*3/uL Final    Neutrophils % 08/24/2024 54.9  40.0 - 80.0 % Final    Immature Granulocytes %, Automated 08/24/2024 0.2  0.0 - 0.9 % Final    Immature Granulocyte Count (IG) includes promyelocytes, myelocytes and metamyelocytes but does not include bands. Percent differential counts (%) should be interpreted in the context of the absolute cell counts (cells/UL).    Lymphocytes % 08/24/2024 31.0  13.0 - 44.0 % Final    Monocytes % 08/24/2024 10.3  2.0 - 10.0 % Final    Eosinophils % 08/24/2024 3.2  0.0 - 6.0 % Final    Basophils % 08/24/2024 0.4  0.0 - 2.0 % Final    Neutrophils Absolute 08/24/2024 2.57  1.20 - 7.70 x10*3/uL Final    Percent differential counts (%) should be interpreted in the context of the absolute cell counts (cells/uL).    Immature Granulocytes Absolute, Au* 08/24/2024 0.01  0.00 - 0.70 x10*3/uL Final    Lymphocytes Absolute 08/24/2024 1.45  1.20 - 4.80 x10*3/uL Final    Monocytes  "Absolute 08/24/2024 0.48  0.10 - 1.00 x10*3/uL Final    Eosinophils Absolute 08/24/2024 0.15  0.00 - 0.70 x10*3/uL Final    Basophils Absolute 08/24/2024 0.02  0.00 - 0.10 x10*3/uL Final    Prostate Specific Antigen,Screen 08/24/2024 0.32  <=4.00 ng/mL Final    Albumin, Urine Random 08/24/2024 25.1  Not established mg/L Final    Creatinine, Urine Random 08/24/2024 157.8  20.0 - 370.0 mg/dL Final    Albumin/Creatinine Ratio 08/24/2024 15.9  <30.0 ug/mg Creat Final        Review of Systems   Constitutional:  Negative for chills, diaphoresis, fever and unexpected weight change.   HENT:  Negative for congestion, sinus pressure, sinus pain, sneezing and sore throat.    Respiratory:  Negative for cough, chest tightness, shortness of breath and wheezing.    Cardiovascular:  Negative for chest pain, palpitations and leg swelling.   Gastrointestinal:  Negative for abdominal pain, constipation, diarrhea, nausea and vomiting.   Endocrine: Negative for cold intolerance, heat intolerance, polydipsia, polyphagia and polyuria.   Genitourinary:  Negative for dysuria, frequency, hematuria and urgency.   Neurological:  Negative for dizziness, syncope, light-headedness, numbness and headaches.   Hematological:  Negative for adenopathy.   Psychiatric/Behavioral:  Negative for confusion and dysphoric mood. The patient is not nervous/anxious.        Objective   /84 (BP Location: Right arm, Patient Position: Sitting, BP Cuff Size: Large adult)   Pulse 69   Resp 18   Ht 1.778 m (5' 10\")   Wt 130 kg (286 lb)   SpO2 94%   BMI 41.04 kg/m²     Physical Exam  Vitals and nursing note reviewed.   Constitutional:       General: He is not in acute distress.     Appearance: Normal appearance.   HENT:      Head: Normocephalic and atraumatic.      Nose: Nose normal.   Eyes:      Extraocular Movements: Extraocular movements intact.      Conjunctiva/sclera: Conjunctivae normal.      Pupils: Pupils are equal, round, and reactive to light. "   Cardiovascular:      Rate and Rhythm: Normal rate and regular rhythm.      Heart sounds: No murmur heard.     No friction rub. No gallop.   Pulmonary:      Effort: Pulmonary effort is normal.      Breath sounds: Normal breath sounds. No wheezing, rhonchi or rales.   Abdominal:      General: Bowel sounds are normal. There is no distension.      Palpations: Abdomen is soft.      Tenderness: There is no abdominal tenderness.   Musculoskeletal:         General: Normal range of motion.      Cervical back: Normal range of motion and neck supple.   Skin:     General: Skin is warm and dry.   Neurological:      General: No focal deficit present.      Mental Status: He is alert and oriented to person, place, and time.      Deep Tendon Reflexes: Reflexes normal.   Psychiatric:         Mood and Affect: Mood normal.         Behavior: Behavior normal.         Thought Content: Thought content normal.         Judgment: Judgment normal.         Assessment/Plan   Problem List Items Addressed This Visit             ICD-10-CM    Abnormal liver function R94.5     - AST high at 61 (27) (17)   - ALT 62 (39) (25)  - repeat LFTs and add hepatitis panel  - drinks alcohol --> 2 drinks per day   - denies acetaminophen use   - he is on rosuvastatin, but has been on for years without elevation in LFTs  - recheck LFTs in about 1 month          Relevant Orders    Hepatitis panel, acute    Comprehensive Metabolic Panel    ASHD (arteriosclerotic heart disease) I25.10     - follows with Dr. Li  - on metoprolol, crestor, aspirin         BMI 40.0-44.9, adult (Multi) Z68.41     - Encouraged healthy lifestyle, including adequate exercise and high fiber, low fat and low carb diet.          Class 3 severe obesity due to excess calories with serious comorbidity and body mass index (BMI) of 40.0 to 44.9 in adult (Multi) E66.01, Z68.41     - Encouraged healthy lifestyle, including adequate exercise and high fiber, low fat and low carb diet.           Encounter for immunization Z23     - declines PCV 20  - recommended the following vaccines at the pharmacy: Shingrix, Tdap, COVID-19         Essential hypertension I10     - controlled   - current regimen: lisinopril 20 mg daily, metoprolol tartate 50 mg twice daily           Relevant Orders    Comprehensive Metabolic Panel    Microalbuminuria due to type 2 diabetes mellitus (Multi) E11.29, R80.9     - continue lisinopril   - no microalbuminuria on 8/24/24 labs          Mixed hyperlipidemia E78.2     - controlled. Continue rosuvastatin  - recheck August 2025         Type 2 diabetes mellitus with hyperglycemia, without long-term current use of insulin (Multi) - Primary E11.65     - HbA1c worse at 7.6 (7.1) (8.8) (11.6) (7) (7.3) (6.9) (6.9)  - current regimen: metformin 1000 mg twice daily and rybelsus 14 mg daily            Relevant Medications    Farxiga 5 mg    Other Relevant Orders    Hemoglobin A1C    Vitamin B12 deficiency E53.8     - continue vitamin B12         Vitamin D deficiency E55.9     - continue vitamin D 5000 international units  daily          Relevant Medications    ergocalciferol (Vitamin D-2) 1.25 MG (86834 UT) capsule (Start on 9/1/2024)    Other Relevant Orders    Vitamin D 25-Hydroxy,Total (for eval of Vitamin D levels)

## 2024-09-27 DIAGNOSIS — I10 ESSENTIAL HYPERTENSION: ICD-10-CM

## 2024-09-27 RX ORDER — METOPROLOL TARTRATE 50 MG/1
50 TABLET ORAL EVERY 12 HOURS
Qty: 180 TABLET | Refills: 1 | OUTPATIENT
Start: 2024-09-27 | End: 2025-03-26

## 2024-10-23 NOTE — PROGRESS NOTES
"Counseling:  The patient was counseled regarding diagnostic results, instructions for management, risk factor reductions, prognosis, patient and family education, impressions, risks and benefits of treatment options and importance of compliance with treatment.      Chief Complaint:   The patient presents today for annual followup of CAD.     History Of Present Illness:    Ayan Richardson is a 59 year old male patient who presents today for annual followup of CAD. His PMH is significant for CAD with h/o NSTEMI s/p PTCA/stent of Cx 04/05/2018 and s/p staged PTCA/stent of PI branch of the RCA 04/12/2018, dyslipidemia, and HTN. Over the past year, the patient states that he has done well from a cardiac standpoint. He denies any CP, chest discomfort or SOB. BP has been stable. EKG today shows NSR with no acute changes. The patient is compliant with his prescribed medications.      Last Recorded Vitals:  Vitals:    10/24/24 1028   BP: 130/68   BP Location: Left arm   Patient Position: Sitting   BP Cuff Size: Adult   Pulse: 91   Weight: 128 kg (282 lb 12.8 oz)   Height: 1.778 m (5' 10\")       Past Surgical History:  He has a past surgical history that includes Other surgical history (07/27/2021).      Social History:  He reports that he quit smoking about 2 years ago. His smoking use included cigarettes. He started smoking about 32 years ago. He has a 30 pack-year smoking history. He has never used smokeless tobacco. He reports current alcohol use of about 14.0 standard drinks of alcohol per week. He reports that he does not use drugs.    Family History:  Family History   Problem Relation Name Age of Onset    COPD Mother      Coronary artery disease Mother      Diabetes type II Father      Coronary artery disease Father      Lupus Daughter          Allergies:  Atorvastatin    Outpatient Medications:  Current Outpatient Medications   Medication Instructions    aspirin (Aspir-Low) 81 mg EC tablet 1 tablet, Daily    blood sugar " diagnostic (Blood Glucose Test) strip Check blood sugars once daily    Dexcom G4 platinum transmitter device As needed    ergocalciferol (VITAMIN D-2) 1,250 mcg, oral, Once Weekly    Farxiga 5 mg, oral, Daily    lancets misc Check blood sugars once daily    lisinopril 20 mg, oral, Daily    metFORMIN XR (GLUCOPHAGE-XR) 1,000 mg, oral, 2 times daily    metoprolol tartrate (LOPRESSOR) 50 mg, oral, Every 12 hours    OneTouch Delica Plus Lancet 30 gauge misc Daily    rosuvastatin (CRESTOR) 40 mg, oral, Daily    semaglutide (RYBELSUS) 14 mg, oral, Daily     Review of Systems   All other systems reviewed and are negative.     Physical Exam:  Constitutional:       Appearance: Healthy appearance. Not in distress.   Neck:      Vascular: No JVR. JVD normal.   Pulmonary:      Effort: Pulmonary effort is normal.      Breath sounds: Normal breath sounds. No wheezing. No rhonchi. No rales.   Chest:      Chest wall: Not tender to palpatation.   Cardiovascular:      PMI at left midclavicular line. Normal rate. Regular rhythm. Normal S1. Normal S2.       Murmurs: There is no murmur.      No gallop.  No click. No rub.   Pulses:     Intact distal pulses.   Edema:     Peripheral edema absent.   Abdominal:      General: Bowel sounds are normal.      Palpations: Abdomen is soft.      Tenderness: There is no abdominal tenderness.   Musculoskeletal: Normal range of motion.         General: No tenderness. Skin:     General: Skin is warm and dry.   Neurological:      General: No focal deficit present.      Mental Status: Alert and oriented to person, place and time.          Last Labs:  CBC -  Lab Results   Component Value Date    WBC 4.7 08/24/2024    HGB 14.1 08/24/2024    HCT 42.1 08/24/2024    MCV 92 08/24/2024     08/24/2024       CMP -  Lab Results   Component Value Date    CALCIUM 9.2 08/24/2024    PROT 6.5 08/24/2024    ALBUMIN 4.1 08/24/2024    AST 61 (H) 08/24/2024    ALT 62 (H) 08/24/2024    ALKPHOS 36 08/24/2024    BILITOT  0.7 08/24/2024       LIPID PANEL -   Lab Results   Component Value Date    CHOL 95 08/24/2024    TRIG 117 08/24/2024    HDL 41.9 08/24/2024    CHHDL 2.3 08/24/2024    LDLF 15 08/02/2023    VLDL 23 08/24/2024    NHDL 53 08/24/2024       RENAL FUNCTION PANEL -   Lab Results   Component Value Date    GLUCOSE 180 (H) 08/24/2024     08/24/2024    K 4.6 08/24/2024     08/24/2024    CO2 27 08/24/2024    ANIONGAP 12 08/24/2024    BUN 12 08/24/2024    CREATININE 0.98 08/24/2024    GFRMALE 73 08/02/2023    CALCIUM 9.2 08/24/2024    ALBUMIN 4.1 08/24/2024        Lab Results   Component Value Date    HGBA1C 7.6 (H) 08/24/2024    HGBA1C 7.1 (A) 05/14/2024       Last Cardiology Tests:  07/14/2021 - Cardiac Stress Test  1. No clinical or electrocardiographic evidence for ischemia at maximal workload.  2. The adequate level of stress was achieved.     05/17/2018 - Echocardiogram   1. Normal left ventricular size and regional systolic function with an ejection fraction of 55%.  2. Dilated right ventricle with mild systolic dysfunction.   3. Trace tricuspid regurgitation.     04/05/2018 - Cardiac Catheterization (LH)  1. Double vessel coronary artery disease without proximal left anterior descending involvement.  2. The right posterolateral branch showed severe atherosclerotic disease.  3. Successful PTCA/stent of Cx.     Lab review: I have personally reviewed the laboratory result(s).    Assessment/Plan   1) CAD s/p PCI of Cx and RCA  On ASA 81 mg daily, lisinopril 20 mg daily, metoprolol 50 mg BID, rosuvastatin 40 mg daily   Lipid panel 08/24/2024 with total cholesterol, LDL and triglycerides of 95, 30 and 117 respectively   CMP 08/24/2024 with elevated AST and ALT of 61 and 62 respectively - per the patient, PCP advised to reduce alcohol consumption and have repeat blood work.   Denies CP, chest discomfort or SOB  BP stable  EKG stable  If liver enzymes do not improve with reduction in alcohol consumption, patient is  to contact our office to discuss rosuvastatin dosing.   Continue current medical Rx   F/U 1 year      2) Smoking  Counselled about risks of smoking including worsening of CAD, HTN and Lung disease. D/w patient about options of quitting smoking including Wellbutrin, Nicotine Patch or gum and Chantix.         Scribe Attestation  By signing my name below, I, Flaquita Pelayo   attest that this documentation has been prepared under the direction and in the presence of Justin Li MD.

## 2024-10-24 ENCOUNTER — APPOINTMENT (OUTPATIENT)
Dept: CARDIOLOGY | Facility: HOSPITAL | Age: 59
End: 2024-10-24
Payer: COMMERCIAL

## 2024-10-24 VITALS
DIASTOLIC BLOOD PRESSURE: 68 MMHG | WEIGHT: 282.8 LBS | HEIGHT: 70 IN | HEART RATE: 91 BPM | SYSTOLIC BLOOD PRESSURE: 130 MMHG | BODY MASS INDEX: 40.49 KG/M2

## 2024-10-24 DIAGNOSIS — I25.10 CAD IN NATIVE ARTERY: ICD-10-CM

## 2024-10-24 PROCEDURE — 93010 ELECTROCARDIOGRAM REPORT: CPT | Performed by: INTERNAL MEDICINE

## 2024-10-24 PROCEDURE — 3061F NEG MICROALBUMINURIA REV: CPT | Performed by: INTERNAL MEDICINE

## 2024-10-24 PROCEDURE — 3008F BODY MASS INDEX DOCD: CPT | Performed by: INTERNAL MEDICINE

## 2024-10-24 PROCEDURE — 1036F TOBACCO NON-USER: CPT | Performed by: INTERNAL MEDICINE

## 2024-10-24 PROCEDURE — 93005 ELECTROCARDIOGRAM TRACING: CPT | Performed by: INTERNAL MEDICINE

## 2024-10-24 PROCEDURE — 3048F LDL-C <100 MG/DL: CPT | Performed by: INTERNAL MEDICINE

## 2024-10-24 PROCEDURE — 4010F ACE/ARB THERAPY RXD/TAKEN: CPT | Performed by: INTERNAL MEDICINE

## 2024-10-24 PROCEDURE — 99213 OFFICE O/P EST LOW 20 MIN: CPT | Performed by: INTERNAL MEDICINE

## 2024-10-24 PROCEDURE — 3078F DIAST BP <80 MM HG: CPT | Performed by: INTERNAL MEDICINE

## 2024-10-24 PROCEDURE — 3051F HG A1C>EQUAL 7.0%<8.0%: CPT | Performed by: INTERNAL MEDICINE

## 2024-10-24 PROCEDURE — 3075F SYST BP GE 130 - 139MM HG: CPT | Performed by: INTERNAL MEDICINE

## 2024-10-24 NOTE — PATIENT INSTRUCTIONS
Continue all current medications as prescribed.  If your liver enzymes do not improve on your next scheduled blood work, please contact our office and we can discuss reducing the dose of your rosuvastatin.  Followup with Dr. Li in 1 year, sooner should any issues or concerns arise before then.     If you have any questions or cardiac concerns, please call our office at [phone].

## 2024-10-28 ENCOUNTER — APPOINTMENT (OUTPATIENT)
Dept: CARDIOLOGY | Facility: CLINIC | Age: 59
End: 2024-10-28
Payer: COMMERCIAL

## 2024-10-28 DIAGNOSIS — I10 ESSENTIAL HYPERTENSION: ICD-10-CM

## 2024-10-28 RX ORDER — METOPROLOL TARTRATE 50 MG/1
50 TABLET ORAL EVERY 12 HOURS
Qty: 180 TABLET | Refills: 1 | Status: SHIPPED | OUTPATIENT
Start: 2024-10-28 | End: 2025-04-26

## 2024-11-30 ENCOUNTER — LAB (OUTPATIENT)
Dept: LAB | Facility: LAB | Age: 59
End: 2024-11-30
Payer: COMMERCIAL

## 2024-11-30 DIAGNOSIS — E11.65 TYPE 2 DIABETES MELLITUS WITH HYPERGLYCEMIA, WITHOUT LONG-TERM CURRENT USE OF INSULIN: ICD-10-CM

## 2024-11-30 DIAGNOSIS — E55.9 VITAMIN D DEFICIENCY: ICD-10-CM

## 2024-11-30 DIAGNOSIS — I10 ESSENTIAL HYPERTENSION: ICD-10-CM

## 2024-11-30 DIAGNOSIS — R94.5 ABNORMAL LIVER FUNCTION: ICD-10-CM

## 2024-11-30 LAB
25(OH)D3 SERPL-MCNC: 36 NG/ML (ref 30–100)
ALBUMIN SERPL BCP-MCNC: 4.3 G/DL (ref 3.4–5)
ALP SERPL-CCNC: 41 U/L (ref 33–136)
ALT SERPL W P-5'-P-CCNC: 35 U/L (ref 10–52)
ANION GAP SERPL CALC-SCNC: 14 MMOL/L (ref 10–20)
AST SERPL W P-5'-P-CCNC: 28 U/L (ref 9–39)
BILIRUB SERPL-MCNC: 0.7 MG/DL (ref 0–1.2)
BUN SERPL-MCNC: 20 MG/DL (ref 6–23)
CALCIUM SERPL-MCNC: 9.7 MG/DL (ref 8.6–10.3)
CHLORIDE SERPL-SCNC: 103 MMOL/L (ref 98–107)
CO2 SERPL-SCNC: 29 MMOL/L (ref 21–32)
CREAT SERPL-MCNC: 1.18 MG/DL (ref 0.5–1.3)
EGFRCR SERPLBLD CKD-EPI 2021: 71 ML/MIN/1.73M*2
EST. AVERAGE GLUCOSE BLD GHB EST-MCNC: 140 MG/DL
GLUCOSE SERPL-MCNC: 197 MG/DL (ref 74–99)
HAV IGM SER QL: NONREACTIVE
HBA1C MFR BLD: 6.5 %
HBV CORE IGM SER QL: NONREACTIVE
HBV SURFACE AG SERPL QL IA: NONREACTIVE
HCV AB SER QL: NONREACTIVE
POTASSIUM SERPL-SCNC: 5.2 MMOL/L (ref 3.5–5.3)
PROT SERPL-MCNC: 6.8 G/DL (ref 6.4–8.2)
SODIUM SERPL-SCNC: 141 MMOL/L (ref 136–145)

## 2024-11-30 PROCEDURE — 80053 COMPREHEN METABOLIC PANEL: CPT

## 2024-11-30 PROCEDURE — 82306 VITAMIN D 25 HYDROXY: CPT

## 2024-11-30 PROCEDURE — 36415 COLL VENOUS BLD VENIPUNCTURE: CPT

## 2024-11-30 PROCEDURE — 80074 ACUTE HEPATITIS PANEL: CPT

## 2024-11-30 PROCEDURE — 83036 HEMOGLOBIN GLYCOSYLATED A1C: CPT

## 2024-12-04 ENCOUNTER — APPOINTMENT (OUTPATIENT)
Dept: PRIMARY CARE | Facility: CLINIC | Age: 59
End: 2024-12-04
Payer: COMMERCIAL

## 2024-12-04 VITALS
DIASTOLIC BLOOD PRESSURE: 82 MMHG | RESPIRATION RATE: 16 BRPM | HEIGHT: 70 IN | HEART RATE: 74 BPM | SYSTOLIC BLOOD PRESSURE: 136 MMHG | BODY MASS INDEX: 40.37 KG/M2 | WEIGHT: 282 LBS | OXYGEN SATURATION: 96 %

## 2024-12-04 DIAGNOSIS — R94.5 ABNORMAL LIVER FUNCTION: ICD-10-CM

## 2024-12-04 DIAGNOSIS — E78.2 MIXED HYPERLIPIDEMIA: ICD-10-CM

## 2024-12-04 DIAGNOSIS — E66.813 CLASS 3 SEVERE OBESITY DUE TO EXCESS CALORIES WITH SERIOUS COMORBIDITY AND BODY MASS INDEX (BMI) OF 40.0 TO 44.9 IN ADULT: ICD-10-CM

## 2024-12-04 DIAGNOSIS — F17.211 CIGARETTE NICOTINE DEPENDENCE IN REMISSION: ICD-10-CM

## 2024-12-04 DIAGNOSIS — I25.10 ASHD (ARTERIOSCLEROTIC HEART DISEASE): ICD-10-CM

## 2024-12-04 DIAGNOSIS — I10 ESSENTIAL HYPERTENSION: ICD-10-CM

## 2024-12-04 DIAGNOSIS — E11.29 MICROALBUMINURIA DUE TO TYPE 2 DIABETES MELLITUS (MULTI): ICD-10-CM

## 2024-12-04 DIAGNOSIS — E11.65 TYPE 2 DIABETES MELLITUS WITH HYPERGLYCEMIA, WITHOUT LONG-TERM CURRENT USE OF INSULIN: Primary | ICD-10-CM

## 2024-12-04 DIAGNOSIS — R80.9 MICROALBUMINURIA DUE TO TYPE 2 DIABETES MELLITUS (MULTI): ICD-10-CM

## 2024-12-04 DIAGNOSIS — E53.8 VITAMIN B12 DEFICIENCY: ICD-10-CM

## 2024-12-04 DIAGNOSIS — Z12.5 PROSTATE CANCER SCREENING: ICD-10-CM

## 2024-12-04 DIAGNOSIS — E55.9 VITAMIN D DEFICIENCY: ICD-10-CM

## 2024-12-04 DIAGNOSIS — E66.01 CLASS 3 SEVERE OBESITY DUE TO EXCESS CALORIES WITH SERIOUS COMORBIDITY AND BODY MASS INDEX (BMI) OF 40.0 TO 44.9 IN ADULT: ICD-10-CM

## 2024-12-04 PROCEDURE — 1036F TOBACCO NON-USER: CPT | Performed by: FAMILY MEDICINE

## 2024-12-04 PROCEDURE — 3061F NEG MICROALBUMINURIA REV: CPT | Performed by: FAMILY MEDICINE

## 2024-12-04 PROCEDURE — 3079F DIAST BP 80-89 MM HG: CPT | Performed by: FAMILY MEDICINE

## 2024-12-04 PROCEDURE — 3044F HG A1C LEVEL LT 7.0%: CPT | Performed by: FAMILY MEDICINE

## 2024-12-04 PROCEDURE — 99214 OFFICE O/P EST MOD 30 MIN: CPT | Performed by: FAMILY MEDICINE

## 2024-12-04 PROCEDURE — 3048F LDL-C <100 MG/DL: CPT | Performed by: FAMILY MEDICINE

## 2024-12-04 PROCEDURE — 3008F BODY MASS INDEX DOCD: CPT | Performed by: FAMILY MEDICINE

## 2024-12-04 PROCEDURE — 3075F SYST BP GE 130 - 139MM HG: CPT | Performed by: FAMILY MEDICINE

## 2024-12-04 PROCEDURE — 4010F ACE/ARB THERAPY RXD/TAKEN: CPT | Performed by: FAMILY MEDICINE

## 2024-12-04 RX ORDER — LISINOPRIL 20 MG/1
20 TABLET ORAL DAILY
Qty: 90 TABLET | Refills: 1 | Status: SHIPPED | OUTPATIENT
Start: 2024-12-04 | End: 2025-06-02

## 2024-12-04 RX ORDER — DAPAGLIFLOZIN 5 MG/1
5 TABLET, FILM COATED ORAL DAILY
Qty: 90 TABLET | Refills: 1 | Status: SHIPPED | OUTPATIENT
Start: 2024-12-04 | End: 2025-06-02

## 2024-12-04 RX ORDER — ROSUVASTATIN CALCIUM 40 MG/1
40 TABLET, COATED ORAL DAILY
Qty: 90 TABLET | Refills: 1 | Status: SHIPPED | OUTPATIENT
Start: 2024-12-04 | End: 2025-06-02

## 2024-12-04 RX ORDER — METFORMIN HYDROCHLORIDE 500 MG/1
1000 TABLET, EXTENDED RELEASE ORAL 2 TIMES DAILY
Qty: 360 TABLET | Refills: 1 | Status: SHIPPED | OUTPATIENT
Start: 2024-12-04 | End: 2025-06-02

## 2024-12-04 ASSESSMENT — ENCOUNTER SYMPTOMS
UNEXPECTED WEIGHT CHANGE: 0
DIARRHEA: 0
DYSURIA: 0
WHEEZING: 0
CONSTIPATION: 0
SHORTNESS OF BREATH: 0
NUMBNESS: 0
FEVER: 0
HEADACHES: 0
SORE THROAT: 0
ADENOPATHY: 0
DIZZINESS: 0
SINUS PRESSURE: 0
SINUS PAIN: 0
POLYDIPSIA: 0
COUGH: 0
HEMATURIA: 0
PALPITATIONS: 0
CHILLS: 0
LIGHT-HEADEDNESS: 0
NAUSEA: 0
FREQUENCY: 0
DIAPHORESIS: 0
NERVOUS/ANXIOUS: 0
CONFUSION: 0
ABDOMINAL PAIN: 0
CHEST TIGHTNESS: 0
VOMITING: 0
POLYPHAGIA: 0
DYSPHORIC MOOD: 0

## 2024-12-04 ASSESSMENT — PATIENT HEALTH QUESTIONNAIRE - PHQ9
SUM OF ALL RESPONSES TO PHQ9 QUESTIONS 1 AND 2: 0
1. LITTLE INTEREST OR PLEASURE IN DOING THINGS: NOT AT ALL
2. FEELING DOWN, DEPRESSED OR HOPELESS: NOT AT ALL

## 2024-12-04 NOTE — ASSESSMENT & PLAN NOTE
- Declined flu vaccine.    - declined PCV 20  - recommended the following vaccines at the pharmacy: Tdap, Shingrix, RSV, COVID-19,

## 2024-12-04 NOTE — PATIENT INSTRUCTIONS
Ayan Richardson ,    Thank you for coming in today. We at [facility] appreciate your trust in our care. If you have any questions or concerns about the care you received today, please do not hesitate to contact us at [phone].    The following instructions were discussed today:    - Follow up in 6 months for chronic issues   - Please get blood work done 1-2 weeks prior to your next visit. For blood work: Nothing to eat or drink for at least 10 hours prior. Okay for water or black coffee.   - I recommend the following vaccines at the pharmacy: Tdap, Shingrix, RSV, COVID-19  - I recommend getting established with an eye doctor

## 2024-12-04 NOTE — ASSESSMENT & PLAN NOTE
- HbA1c improved at 6.5 (7.6) (7.1) (8.8) (11.6) (7) (7.3) (6.9) (6.9)  - current regimen: metformin 1000 mg twice daily, rybelsus 14 mg daily, farxiga 5 mg daily   - continue current regimen

## 2024-12-04 NOTE — PROGRESS NOTES
Subjective   Patient ID: Ayan Richardson is a 59 y.o. 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    HPI   routine follow up. chronic issues as per assessment and plan.     Reviewed 11/30/24 labs --> HbA1c 6.5; ; AST returned to normal at 28 (61); ALT returned to normal at 35 (62); hepatitis panel negative; vitamin D 36 (28)    Saw Dr. Li in 10/2024 for coronary artery disease. No changes made.     Lab on 11/30/2024   Component Date Value Ref Range Status    Hepatitis B Surface AG 11/30/2024 Nonreactive  Nonreactive Final    Biotin interference may cause falsely decreased results. Patients taking a Biotin dose of up to 5 mg/day should refrain from taking Biotin for 24 hours before sample collection. Providers may contact their local laboratory for  further information.    Hepatitis A  AB- IgM 11/30/2024 Nonreactive  Nonreactive Final    Biotin interference may cause falsely decreased results. Patients taking a Biotin dose of up to 5 mg/day should refrain from taking Biotin for 24 hours before sample collection. Providers may contact their local laboratory  for further information.        Hepatitis B Core AB; IgM 11/30/2024 Nonreactive  Nonreactive Final    Results from patients taking biotin supplements or receiving high-dose biotin therapy should be interpreted with caution due to possible interference with this test. Providers may contact their local laboratory for further information.        Hepatitis C AB 11/30/2024 Nonreactive  Nonreactive Final    Results from patients taking biotin supplements or receiving high-dose biotin therapy should be interpreted with caution due to possible interference with this test. Providers may contact their local laboratory for further information.    Glucose 11/30/2024 197 (H)  74 - 99 mg/dL Final    Sodium 11/30/2024 141  136 - 145 mmol/L Final    Potassium 11/30/2024 5.2   3.5 - 5.3 mmol/L Final    Chloride 11/30/2024 103  98 - 107 mmol/L Final    Bicarbonate 11/30/2024 29  21 - 32 mmol/L Final    Anion Gap 11/30/2024 14  10 - 20 mmol/L Final    Urea Nitrogen 11/30/2024 20  6 - 23 mg/dL Final    Creatinine 11/30/2024 1.18  0.50 - 1.30 mg/dL Final    eGFR 11/30/2024 71  >60 mL/min/1.73m*2 Final    Calculations of estimated GFR are performed using the 2021 CKD-EPI Study Refit equation without the race variable for the IDMS-Traceable creatinine methods.  https://jasn.asnjournals.org/content/early/2021/09/22/ASN.0135390371    Calcium 11/30/2024 9.7  8.6 - 10.3 mg/dL Final    Albumin 11/30/2024 4.3  3.4 - 5.0 g/dL Final    Alkaline Phosphatase 11/30/2024 41  33 - 136 U/L Final    Total Protein 11/30/2024 6.8  6.4 - 8.2 g/dL Final    AST 11/30/2024 28  9 - 39 U/L Final    Bilirubin, Total 11/30/2024 0.7  0.0 - 1.2 mg/dL Final    ALT 11/30/2024 35  10 - 52 U/L Final    Patients treated with Sulfasalazine may generate falsely decreased results for ALT.    Vitamin D, 25-Hydroxy, Total 11/30/2024 36  30 - 100 ng/mL Final    Hemoglobin A1C 11/30/2024 6.5 (H)  See comment % Final    Estimated Average Glucose 11/30/2024 140  Not Established mg/dL Final        Review of Systems   Constitutional:  Negative for chills, diaphoresis, fever and unexpected weight change.   HENT:  Negative for congestion, sinus pressure, sinus pain, sneezing and sore throat.    Respiratory:  Negative for cough, chest tightness, shortness of breath and wheezing.    Cardiovascular:  Negative for chest pain, palpitations and leg swelling.   Gastrointestinal:  Negative for abdominal pain, constipation, diarrhea, nausea and vomiting.   Endocrine: Negative for cold intolerance, heat intolerance, polydipsia, polyphagia and polyuria.   Genitourinary:  Negative for dysuria, frequency, hematuria and urgency.   Neurological:  Negative for dizziness, syncope, light-headedness, numbness and headaches.   Hematological:  Negative for  "adenopathy.   Psychiatric/Behavioral:  Negative for confusion and dysphoric mood. The patient is not nervous/anxious.        Objective   /82 (BP Location: Right arm, Patient Position: Sitting, BP Cuff Size: Large adult)   Pulse 74   Resp 16   Ht 1.778 m (5' 10\")   Wt 128 kg (282 lb)   SpO2 96%   BMI 40.46 kg/m²     Physical Exam  Vitals and nursing note reviewed.   Constitutional:       General: He is not in acute distress.     Appearance: Normal appearance.   HENT:      Head: Normocephalic and atraumatic.      Nose: Nose normal.   Eyes:      Extraocular Movements: Extraocular movements intact.      Conjunctiva/sclera: Conjunctivae normal.      Pupils: Pupils are equal, round, and reactive to light.   Cardiovascular:      Rate and Rhythm: Normal rate and regular rhythm.      Heart sounds: No murmur heard.     No friction rub. No gallop.   Pulmonary:      Effort: Pulmonary effort is normal.      Breath sounds: Normal breath sounds. No wheezing, rhonchi or rales.   Abdominal:      General: Bowel sounds are normal. There is no distension.      Palpations: Abdomen is soft.      Tenderness: There is no abdominal tenderness.   Musculoskeletal:         General: Normal range of motion.      Cervical back: Normal range of motion and neck supple.   Skin:     General: Skin is warm and dry.   Neurological:      General: No focal deficit present.      Mental Status: He is alert and oriented to person, place, and time.      Deep Tendon Reflexes: Reflexes normal.   Psychiatric:         Mood and Affect: Mood normal.         Behavior: Behavior normal.         Thought Content: Thought content normal.         Judgment: Judgment normal.         Assessment/Plan   Problem List Items Addressed This Visit             ICD-10-CM    Abnormal liver function R94.5     - AST returned to normal at 28 (61) on 11/30/24 labs   - ALT returned to normal at 35 (62) on 11/30/24 labs   - hepatitis panel negative on 11/30/24 labs   - drinks " alcohol --> 2 drinks per day   - denies acetaminophen use            ASHD (arteriosclerotic heart disease) I25.10     - follows with Dr. Li  - on metoprolol, crestor, aspirin         Relevant Orders    CBC and Auto Differential    Comprehensive Metabolic Panel    BMI 40.0-44.9, adult (Multi) Z68.41     - Encouraged healthy lifestyle, including adequate exercise and high fiber, low fat and low carb diet.          Relevant Orders    CBC and Auto Differential    Comprehensive Metabolic Panel    TSH with reflex to Free T4 if abnormal    Cigarette nicotine dependence in remission F17.211     - recommended LDCT. He is going to check with his insurance about coverage         Class 3 severe obesity due to excess calories with serious comorbidity and body mass index (BMI) of 40.0 to 44.9 in adult E66.813, E66.01, Z68.41     - Encouraged healthy lifestyle, including adequate exercise and high fiber, low fat and low carb diet.          Relevant Orders    CBC and Auto Differential    Comprehensive Metabolic Panel    TSH with reflex to Free T4 if abnormal    Essential hypertension I10     - controlled   - current regimen: lisinopril 20 mg daily, metoprolol tartate 50 mg twice daily           Relevant Medications    lisinopril 20 mg tablet    Other Relevant Orders    CBC and Auto Differential    Comprehensive Metabolic Panel    TSH with reflex to Free T4 if abnormal    Microalbuminuria due to type 2 diabetes mellitus (Multi) E11.29, R80.9     - continue lisinopril   - no microalbuminuria on 8/24/24 labs          Relevant Orders    Albumin-Creatinine Ratio, Urine Random    Mixed hyperlipidemia E78.2     - controlled. Continue rosuvastatin  - recheck August 2025         Relevant Medications    rosuvastatin (Crestor) 40 mg tablet    Other Relevant Orders    CBC and Auto Differential    Comprehensive Metabolic Panel    Lipid Panel    TSH with reflex to Free T4 if abnormal    Type 2 diabetes mellitus with hyperglycemia, without  long-term current use of insulin - Primary E11.65     - HbA1c improved at 6.5 (7.6) (7.1) (8.8) (11.6) (7) (7.3) (6.9) (6.9)  - current regimen: metformin 1000 mg twice daily, rybelsus 14 mg daily, farxiga 5 mg daily   - continue current regimen            Relevant Medications    Farxiga 5 mg    metFORMIN  mg 24 hr tablet    semaglutide (Rybelsus) 14 mg tablet tablet    Other Relevant Orders    Hemoglobin A1C    Albumin-Creatinine Ratio, Urine Random    CBC and Auto Differential    TSH with reflex to Free T4 if abnormal    Vitamin B12 deficiency E53.8     - continue vitamin B12         Relevant Orders    Vitamin B12    Vitamin D deficiency E55.9     - continue vitamin D 5000 international units  daily          Relevant Orders    Vitamin D 25-Hydroxy,Total (for eval of Vitamin D levels)     Other Visit Diagnoses         Codes    Prostate cancer screening     Z12.5    Relevant Orders    Prostate Specific Antigen, Screen

## 2024-12-04 NOTE — ASSESSMENT & PLAN NOTE
- AST returned to normal at 28 (61) on 11/30/24 labs   - ALT returned to normal at 35 (62) on 11/30/24 labs   - hepatitis panel negative on 11/30/24 labs   - drinks alcohol --> 2 drinks per day   - denies acetaminophen use

## 2025-02-06 DIAGNOSIS — E55.9 VITAMIN D DEFICIENCY: ICD-10-CM

## 2025-02-09 RX ORDER — ERGOCALCIFEROL 1.25 MG/1
1.25 CAPSULE ORAL
Qty: 12 CAPSULE | Refills: 1 | Status: SHIPPED | OUTPATIENT
Start: 2025-02-09 | End: 2025-08-08

## 2025-04-19 DIAGNOSIS — I10 ESSENTIAL HYPERTENSION: ICD-10-CM

## 2025-04-21 DIAGNOSIS — I10 ESSENTIAL HYPERTENSION: ICD-10-CM

## 2025-04-21 RX ORDER — METOPROLOL TARTRATE 50 MG/1
50 TABLET ORAL EVERY 12 HOURS
Qty: 180 TABLET | Refills: 1 | OUTPATIENT
Start: 2025-04-21 | End: 2025-10-18

## 2025-04-21 RX ORDER — METOPROLOL TARTRATE 50 MG/1
50 TABLET ORAL EVERY 12 HOURS
Qty: 180 TABLET | Refills: 1 | Status: SHIPPED | OUTPATIENT
Start: 2025-04-21 | End: 2025-10-18

## 2025-05-19 ENCOUNTER — TELEPHONE (OUTPATIENT)
Dept: PRIMARY CARE | Facility: CLINIC | Age: 60
End: 2025-05-19
Payer: COMMERCIAL

## 2025-05-19 NOTE — LETTER
Regarding:  Ayan Richardson  YOB: 1965  [address] [address]  Phone: [phone]     To Whom It May Concern:     Mr. Richardson is a patient of mine that I have care for for the past four years now. He does have a history of hypertension, type 2 diabetes, and heart disease, but all these conditions are well controlled. He is compliant with medications and with follow up appointments. 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     Thank you for your attention to this matter. Please call with any questions or concerns.    Sincerely,           Patricia Roche MD  Senior Attending Physician, [facility] [place]   [facility]  [phone]

## 2025-05-20 NOTE — TELEPHONE ENCOUNTER
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 Once written, I can call him at   [phone] and he will come .  
Letter signed and in my outbox.   
Pt notified, will  today  
day(s)

## 2025-06-05 ENCOUNTER — APPOINTMENT (OUTPATIENT)
Dept: PRIMARY CARE | Facility: CLINIC | Age: 60
End: 2025-06-05
Payer: COMMERCIAL

## 2025-06-05 VITALS
HEART RATE: 88 BPM | SYSTOLIC BLOOD PRESSURE: 110 MMHG | RESPIRATION RATE: 16 BRPM | HEIGHT: 70 IN | BODY MASS INDEX: 40.23 KG/M2 | OXYGEN SATURATION: 94 % | DIASTOLIC BLOOD PRESSURE: 62 MMHG | WEIGHT: 281 LBS

## 2025-06-05 DIAGNOSIS — Z01.00 DIABETIC EYE EXAM (MULTI): ICD-10-CM

## 2025-06-05 DIAGNOSIS — E11.29 MICROALBUMINURIA DUE TO TYPE 2 DIABETES MELLITUS (MULTI): ICD-10-CM

## 2025-06-05 DIAGNOSIS — Z23 ENCOUNTER FOR IMMUNIZATION: ICD-10-CM

## 2025-06-05 DIAGNOSIS — I10 ESSENTIAL HYPERTENSION: ICD-10-CM

## 2025-06-05 DIAGNOSIS — I25.10 ASHD (ARTERIOSCLEROTIC HEART DISEASE): ICD-10-CM

## 2025-06-05 DIAGNOSIS — R80.9 MICROALBUMINURIA DUE TO TYPE 2 DIABETES MELLITUS (MULTI): ICD-10-CM

## 2025-06-05 DIAGNOSIS — E53.8 VITAMIN B12 DEFICIENCY: ICD-10-CM

## 2025-06-05 DIAGNOSIS — F17.211 CIGARETTE NICOTINE DEPENDENCE IN REMISSION: ICD-10-CM

## 2025-06-05 DIAGNOSIS — E11.9 DIABETIC EYE EXAM (MULTI): ICD-10-CM

## 2025-06-05 DIAGNOSIS — E66.813 CLASS 3 SEVERE OBESITY DUE TO EXCESS CALORIES WITH SERIOUS COMORBIDITY AND BODY MASS INDEX (BMI) OF 40.0 TO 44.9 IN ADULT: ICD-10-CM

## 2025-06-05 DIAGNOSIS — E78.2 MIXED HYPERLIPIDEMIA: ICD-10-CM

## 2025-06-05 DIAGNOSIS — E11.65 TYPE 2 DIABETES MELLITUS WITH HYPERGLYCEMIA, WITHOUT LONG-TERM CURRENT USE OF INSULIN: Primary | ICD-10-CM

## 2025-06-05 DIAGNOSIS — E55.9 VITAMIN D DEFICIENCY: ICD-10-CM

## 2025-06-05 DIAGNOSIS — Z12.11 COLON CANCER SCREENING: ICD-10-CM

## 2025-06-05 LAB
25(OH)D3+25(OH)D2 SERPL-MCNC: 72 NG/ML (ref 30–100)
ALBUMIN SERPL-MCNC: 4.2 G/DL (ref 3.6–5.1)
ALBUMIN/CREAT UR: 61 MG/G CREAT
ALP SERPL-CCNC: 41 U/L (ref 35–144)
ALT SERPL-CCNC: 25 U/L (ref 9–46)
ANION GAP SERPL CALCULATED.4IONS-SCNC: 9 MMOL/L (CALC) (ref 7–17)
AST SERPL-CCNC: 24 U/L (ref 10–35)
BASOPHILS # BLD AUTO: 31 CELLS/UL (ref 0–200)
BASOPHILS NFR BLD AUTO: 0.6 %
BILIRUB SERPL-MCNC: 0.7 MG/DL (ref 0.2–1.2)
BUN SERPL-MCNC: 19 MG/DL (ref 7–25)
CALCIUM SERPL-MCNC: 9.4 MG/DL (ref 8.6–10.3)
CHLORIDE SERPL-SCNC: 103 MMOL/L (ref 98–110)
CHOLEST SERPL-MCNC: 84 MG/DL
CHOLEST/HDLC SERPL: 2.3 (CALC)
CO2 SERPL-SCNC: 27 MMOL/L (ref 20–32)
CREAT SERPL-MCNC: 0.94 MG/DL (ref 0.7–1.35)
CREAT UR-MCNC: 183 MG/DL (ref 20–320)
EGFRCR SERPLBLD CKD-EPI 2021: 93 ML/MIN/1.73M2
EOSINOPHIL # BLD AUTO: 112 CELLS/UL (ref 15–500)
EOSINOPHIL NFR BLD AUTO: 2.2 %
ERYTHROCYTE [DISTWIDTH] IN BLOOD BY AUTOMATED COUNT: 12.8 % (ref 11–15)
EST. AVERAGE GLUCOSE BLD GHB EST-MCNC: 169 MG/DL
EST. AVERAGE GLUCOSE BLD GHB EST-SCNC: 9.3 MMOL/L
GLUCOSE SERPL-MCNC: 170 MG/DL (ref 65–99)
HBA1C MFR BLD: 7.5 %
HCT VFR BLD AUTO: 44.1 % (ref 38.5–50)
HDLC SERPL-MCNC: 36 MG/DL
HGB BLD-MCNC: 13.9 G/DL (ref 13.2–17.1)
LDLC SERPL CALC-MCNC: 24 MG/DL (CALC)
LYMPHOCYTES # BLD AUTO: 1561 CELLS/UL (ref 850–3900)
LYMPHOCYTES NFR BLD AUTO: 30.6 %
MCH RBC QN AUTO: 30 PG (ref 27–33)
MCHC RBC AUTO-ENTMCNC: 31.5 G/DL (ref 32–36)
MCV RBC AUTO: 95.2 FL (ref 80–100)
MICROALBUMIN UR-MCNC: 11.2 MG/DL
MONOCYTES # BLD AUTO: 566 CELLS/UL (ref 200–950)
MONOCYTES NFR BLD AUTO: 11.1 %
NEUTROPHILS # BLD AUTO: 2831 CELLS/UL (ref 1500–7800)
NEUTROPHILS NFR BLD AUTO: 55.5 %
NONHDLC SERPL-MCNC: 48 MG/DL (CALC)
PLATELET # BLD AUTO: 234 THOUSAND/UL (ref 140–400)
PMV BLD REES-ECKER: 9.3 FL (ref 7.5–12.5)
POTASSIUM SERPL-SCNC: 4.5 MMOL/L (ref 3.5–5.3)
PROT SERPL-MCNC: 6.7 G/DL (ref 6.1–8.1)
PSA SERPL-MCNC: 0.29 NG/ML
RBC # BLD AUTO: 4.63 MILLION/UL (ref 4.2–5.8)
SODIUM SERPL-SCNC: 139 MMOL/L (ref 135–146)
TRIGL SERPL-MCNC: 161 MG/DL
TSH SERPL-ACNC: 1.77 MIU/L (ref 0.4–4.5)
VIT B12 SERPL-MCNC: 1955 PG/ML (ref 200–1100)
WBC # BLD AUTO: 5.1 THOUSAND/UL (ref 3.8–10.8)

## 2025-06-05 PROCEDURE — 3008F BODY MASS INDEX DOCD: CPT | Performed by: FAMILY MEDICINE

## 2025-06-05 PROCEDURE — 99214 OFFICE O/P EST MOD 30 MIN: CPT | Performed by: FAMILY MEDICINE

## 2025-06-05 PROCEDURE — 3074F SYST BP LT 130 MM HG: CPT | Performed by: FAMILY MEDICINE

## 2025-06-05 PROCEDURE — 3078F DIAST BP <80 MM HG: CPT | Performed by: FAMILY MEDICINE

## 2025-06-05 PROCEDURE — 4010F ACE/ARB THERAPY RXD/TAKEN: CPT | Performed by: FAMILY MEDICINE

## 2025-06-05 PROCEDURE — 1036F TOBACCO NON-USER: CPT | Performed by: FAMILY MEDICINE

## 2025-06-05 RX ORDER — ERGOCALCIFEROL 1.25 MG/1
1.25 CAPSULE ORAL
Qty: 12 CAPSULE | Refills: 1 | Status: SHIPPED | OUTPATIENT
Start: 2025-06-08 | End: 2025-12-05

## 2025-06-05 RX ORDER — METFORMIN HYDROCHLORIDE 500 MG/1
1000 TABLET, EXTENDED RELEASE ORAL 2 TIMES DAILY
Qty: 360 TABLET | Refills: 1 | Status: SHIPPED | OUTPATIENT
Start: 2025-06-05 | End: 2025-12-02

## 2025-06-05 RX ORDER — LANOLIN ALCOHOL/MO/W.PET/CERES
1000 CREAM (GRAM) TOPICAL DAILY
COMMUNITY
End: 2025-06-05 | Stop reason: ALTCHOICE

## 2025-06-05 RX ORDER — DAPAGLIFLOZIN 5 MG/1
5 TABLET, FILM COATED ORAL DAILY
Qty: 90 TABLET | Refills: 1 | Status: CANCELLED | OUTPATIENT
Start: 2025-06-05 | End: 2025-12-02

## 2025-06-05 RX ORDER — METOPROLOL TARTRATE 50 MG/1
50 TABLET ORAL EVERY 12 HOURS
Qty: 180 TABLET | Refills: 1 | Status: SHIPPED | OUTPATIENT
Start: 2025-06-05 | End: 2025-12-02

## 2025-06-05 RX ORDER — ROSUVASTATIN CALCIUM 40 MG/1
40 TABLET, COATED ORAL DAILY
Qty: 90 TABLET | Refills: 1 | Status: SHIPPED | OUTPATIENT
Start: 2025-06-05 | End: 2025-12-02

## 2025-06-05 RX ORDER — DAPAGLIFLOZIN 10 MG/1
10 TABLET, FILM COATED ORAL DAILY
Qty: 90 TABLET | Refills: 1 | Status: SHIPPED | OUTPATIENT
Start: 2025-06-05 | End: 2025-12-02

## 2025-06-05 RX ORDER — LISINOPRIL 20 MG/1
20 TABLET ORAL DAILY
Qty: 90 TABLET | Refills: 1 | Status: SHIPPED | OUTPATIENT
Start: 2025-06-05 | End: 2025-12-02

## 2025-06-05 ASSESSMENT — ENCOUNTER SYMPTOMS
WHEEZING: 0
NERVOUS/ANXIOUS: 0
PALPITATIONS: 0
POLYPHAGIA: 0
FEVER: 0
SORE THROAT: 0
ADENOPATHY: 0
HEMATURIA: 0
SINUS PRESSURE: 0
CHILLS: 0
VOMITING: 0
LIGHT-HEADEDNESS: 0
CHEST TIGHTNESS: 0
CONSTIPATION: 0
POLYDIPSIA: 0
DIARRHEA: 0
NUMBNESS: 0
NAUSEA: 0
HEADACHES: 0
DIZZINESS: 0
COUGH: 0
UNEXPECTED WEIGHT CHANGE: 0
SHORTNESS OF BREATH: 0
DYSURIA: 0
CONFUSION: 0
DYSPHORIC MOOD: 0
ABDOMINAL PAIN: 0
DIAPHORESIS: 0
FREQUENCY: 0
SINUS PAIN: 0

## 2025-06-05 ASSESSMENT — PATIENT HEALTH QUESTIONNAIRE - PHQ9
1. LITTLE INTEREST OR PLEASURE IN DOING THINGS: NOT AT ALL
SUM OF ALL RESPONSES TO PHQ9 QUESTIONS 1 & 2: 0
2. FEELING DOWN, DEPRESSED OR HOPELESS: NOT AT ALL

## 2025-06-05 NOTE — ASSESSMENT & PLAN NOTE
- HbA1c 7.5 (6.5) (7.6) (7.1) (8.8) (11.6) (7) (7.3) (6.9) (6.9)  - current regimen: metformin 1000 mg twice daily, rybelsus 14 mg daily, farxiga 5 mg daily   - INCREASE farxiga to 10 mg daily   - Encouraged healthy lifestyle, including adequate exercise and high fiber, low fat and low carb diet.

## 2025-06-05 NOTE — PATIENT INSTRUCTIONS
Ayan Richardson ,    Thank you for coming in today. We at [facility] appreciate your trust in our care. If you have any questions or concerns about the care you received today, please do not hesitate to contact us at [phone].    The following instructions were discussed today:    - INCREASE farxiga to 10 mg daily   - schedule to see ophthalmology for eye exam. We can send the referral wherever you would like once you know who you want to see  - Follow up in 6 months   - Please get blood work done 1-2 weeks prior to your next visit. For blood work: Nothing to eat or drink for at least 10 hours prior. Okay for water or black coffee.   - I recommend the following vaccines at the pharmacy: Tdap, Shingrix, RSV, COVID-19,

## 2025-06-05 NOTE — ASSESSMENT & PLAN NOTE
- declined PCV 20  - recommended the following vaccines at the pharmacy: Tdap, Shingrix, RSV, COVID-19,

## 2025-06-05 NOTE — PROGRESS NOTES
Subjective   Patient ID: Ayan Richardson is a 59 y.o. male who presents for lab results.    HPI   routine follow up. chronic issues as per assessment and plan.     Reviewed 6/4/25 labs. HBA1c 7.5; vitamin B12 high at 1955; microalbumin high at 61; chol 84; HDL 36; LDL 24;     Orders Only on 06/04/2025   Component Date Value Ref Range Status    CHOLESTEROL, TOTAL 06/04/2025 84  <200 mg/dL Final    HDL CHOLESTEROL 06/04/2025 36 (L)  > OR = 40 mg/dL Final    TRIGLYCERIDES 06/04/2025 161 (H)  <150 mg/dL Final    LDL-CHOLESTEROL 06/04/2025 24  mg/dL (calc) Final    Comment: Reference range: <100     Desirable range <100 mg/dL for primary prevention;    <70 mg/dL for patients with CHD or diabetic patients   with > or = 2 CHD risk factors.     LDL-C is now calculated using the Dario-Guanako   calculation, which is a validated novel method providing   better accuracy than the Friedewald equation in the   estimation of LDL-C.   Dario BRICENO et al. QUINN. 2013;310(19): 9279-6647   (http://education.WegoWise.CENX/faq/USQ414)      CHOL/HDLC RATIO 06/04/2025 2.3  <5.0 (calc) Final    NON HDL CHOLESTEROL 06/04/2025 48  <130 mg/dL (calc) Final    Comment: For patients with diabetes plus 1 major ASCVD risk   factor, treating to a non-HDL-C goal of <100 mg/dL   (LDL-C of <70 mg/dL) is considered a therapeutic   option.      GLUCOSE 06/04/2025 170 (H)  65 - 99 mg/dL Final    Comment:               Fasting reference interval     For someone without known diabetes, a glucose  value >125 mg/dL indicates that they may have  diabetes and this should be confirmed with a  follow-up test.         UREA NITROGEN (BUN) 06/04/2025 19  7 - 25 mg/dL Final    CREATININE 06/04/2025 0.94  0.70 - 1.35 mg/dL Final    EGFR 06/04/2025 93  > OR = 60 mL/min/1.73m2 Final    SODIUM 06/04/2025 139  135 - 146 mmol/L Final    POTASSIUM 06/04/2025 4.5  3.5 - 5.3 mmol/L Final    CHLORIDE 06/04/2025 103  98 - 110 mmol/L Final    CARBON DIOXIDE 06/04/2025 27   20 - 32 mmol/L Final    ELECTROLYTE BALANCE 06/04/2025 9  7 - 17 mmol/L (calc) Final    CALCIUM 06/04/2025 9.4  8.6 - 10.3 mg/dL Final    PROTEIN, TOTAL 06/04/2025 6.7  6.1 - 8.1 g/dL Final    ALBUMIN 06/04/2025 4.2  3.6 - 5.1 g/dL Final    BILIRUBIN, TOTAL 06/04/2025 0.7  0.2 - 1.2 mg/dL Final    ALKALINE PHOSPHATASE 06/04/2025 41  35 - 144 U/L Final    AST 06/04/2025 24  10 - 35 U/L Final    ALT 06/04/2025 25  9 - 46 U/L Final    CREATININE, RANDOM URINE 06/04/2025 183  20 - 320 mg/dL Final    ALBUMIN, URINE 06/04/2025 11.2  See Note: mg/dL Final    Comment: Reference Range:    Reference Range  Not established      ALBUMIN/CREATININE RATIO, RANDOM U* 06/04/2025 61 (H)  <30 mg/g creat Final    Comment:    The ADA defines abnormalities in albumin  excretion as follows:     Albuminuria Category        Result (mg/g creatinine)     Normal to Mildly increased   <30  Moderately increased            Severely increased           > OR = 300     The ADA recommends that at least two of three  specimens collected within a 3-6 month period be  abnormal before considering a patient to be  within a diagnostic category.      WHITE BLOOD CELL COUNT 06/04/2025 5.1  3.8 - 10.8 Thousand/uL Final    RED BLOOD CELL COUNT 06/04/2025 4.63  4.20 - 5.80 Million/uL Final    HEMOGLOBIN 06/04/2025 13.9  13.2 - 17.1 g/dL Final    HEMATOCRIT 06/04/2025 44.1  38.5 - 50.0 % Final    MCV 06/04/2025 95.2  80.0 - 100.0 fL Final    MCH 06/04/2025 30.0  27.0 - 33.0 pg Final    MCHC 06/04/2025 31.5 (L)  32.0 - 36.0 g/dL Final    Comment: For adults, a slight decrease in the calculated MCHC  value (in the range of 30 to 32 g/dL) is most likely  not clinically significant; however, it should be  interpreted with caution in correlation with other  red cell parameters and the patient's clinical  condition.      RDW 06/04/2025 12.8  11.0 - 15.0 % Final    PLATELET COUNT 06/04/2025 234  140 - 400 Thousand/uL Final    MPV 06/04/2025 9.3  7.5 - 12.5 fL  Final    ABSOLUTE NEUTROPHILS 06/04/2025 2,831  1,500 - 7,800 cells/uL Final    ABSOLUTE LYMPHOCYTES 06/04/2025 1,561  850 - 3,900 cells/uL Final    ABSOLUTE MONOCYTES 06/04/2025 566  200 - 950 cells/uL Final    ABSOLUTE EOSINOPHILS 06/04/2025 112  15 - 500 cells/uL Final    ABSOLUTE BASOPHILS 06/04/2025 31  0 - 200 cells/uL Final    NEUTROPHILS 06/04/2025 55.5  % Final    LYMPHOCYTES 06/04/2025 30.6  % Final    MONOCYTES 06/04/2025 11.1  % Final    EOSINOPHILS 06/04/2025 2.2  % Final    BASOPHILS 06/04/2025 0.6  % Final    VITAMIN B12 06/04/2025 1,955 (H)  200 - 1,100 pg/mL Final    TSH W/REFLEX TO FT4 06/04/2025 1.77  0.40 - 4.50 mIU/L Final    VITAMIN D,25-OH,TOTAL,IA 06/04/2025 72  30 - 100 ng/mL Final    Comment: Vitamin D Status         25-OH Vitamin D:     Deficiency:                    <20 ng/mL  Insufficiency:             20 - 29 ng/mL  Optimal:                 > or = 30 ng/mL     For 25-OH Vitamin D testing on patients on   D2-supplementation and patients for whom quantitation   of D2 and D3 fractions is required, the QuestAssureD()  25-OH VIT D, (D2,D3), LC/MS/MS is recommended: order   code 13773 (patients >2yrs).     See Note 1     Note 1     For additional information, please refer to   http://education.Perpetu.[employer]/faq/JNG920   (This link is being provided for informational/  educational purposes only.)      HEMOGLOBIN A1c 06/04/2025 7.5 (H)  <5.7 % Final    Comment: For someone without known diabetes, a hemoglobin A1c  value of 6.5% or greater indicates that they may have   diabetes and this should be confirmed with a follow-up   test.     For someone with known diabetes, a value <7% indicates   that their diabetes is well controlled and a value   greater than or equal to 7% indicates suboptimal   control. A1c targets should be individualized based on   duration of diabetes, age, comorbid conditions, and   other considerations.     Currently, no consensus exists regarding use of  hemoglobin  "A1c for diagnosis of diabetes for children.          eAG (mg/dL) 06/04/2025 169  mg/dL Final    eAG (mmol/L) 06/04/2025 9.3  mmol/L Final   Orders Only on 06/04/2025   Component Date Value Ref Range Status    PSA, TOTAL 06/04/2025 0.29  < OR = 4.00 ng/mL Final    Comment: The total PSA value from this assay system is   standardized against the WHO standard. The test   result will be approximately 20% lower when compared   to the equimolar-standardized total PSA (Zana   Kira). Comparison of serial PSA results should be   interpreted with this fact in mind.     This test was performed using the Siemens   chemiluminescent method. Values obtained from   different assay methods cannot be used  interchangeably. PSA levels, regardless of  value, should not be interpreted as absolute  evidence of the presence or absence of disease.          Review of Systems   Constitutional:  Negative for chills, diaphoresis, fever and unexpected weight change.   HENT:  Negative for congestion, sinus pressure, sinus pain, sneezing and sore throat.    Respiratory:  Negative for cough, chest tightness, shortness of breath and wheezing.    Cardiovascular:  Negative for chest pain, palpitations and leg swelling.   Gastrointestinal:  Negative for abdominal pain, constipation, diarrhea, nausea and vomiting.   Endocrine: Negative for cold intolerance, heat intolerance, polydipsia, polyphagia and polyuria.   Genitourinary:  Negative for dysuria, frequency, hematuria and urgency.   Neurological:  Negative for dizziness, syncope, light-headedness, numbness and headaches.   Hematological:  Negative for adenopathy.   Psychiatric/Behavioral:  Negative for confusion and dysphoric mood. The patient is not nervous/anxious.        Objective   /62 (BP Location: Right arm, Patient Position: Sitting, BP Cuff Size: Large adult)   Pulse 88   Resp 16   Ht 1.778 m (5' 10\")   Wt 127 kg (281 lb)   SpO2 94%   BMI 40.32 kg/m²     Physical " Exam  Vitals and nursing note reviewed.   Constitutional:       General: He is not in acute distress.     Appearance: Normal appearance.   HENT:      Head: Normocephalic and atraumatic.      Nose: Nose normal.   Eyes:      Extraocular Movements: Extraocular movements intact.      Conjunctiva/sclera: Conjunctivae normal.      Pupils: Pupils are equal, round, and reactive to light.   Cardiovascular:      Rate and Rhythm: Normal rate and regular rhythm.      Heart sounds: No murmur heard.     No friction rub. No gallop.   Pulmonary:      Effort: Pulmonary effort is normal.      Breath sounds: Normal breath sounds. No wheezing, rhonchi or rales.   Abdominal:      General: Bowel sounds are normal. There is no distension.      Palpations: Abdomen is soft.      Tenderness: There is no abdominal tenderness.   Musculoskeletal:         General: Normal range of motion.      Cervical back: Normal range of motion and neck supple.   Skin:     General: Skin is warm and dry.   Neurological:      General: No focal deficit present.      Mental Status: He is alert and oriented to person, place, and time.      Deep Tendon Reflexes: Reflexes normal.   Psychiatric:         Mood and Affect: Mood normal.         Behavior: Behavior normal.         Thought Content: Thought content normal.         Judgment: Judgment normal.         Assessment/Plan

## 2025-07-14 DIAGNOSIS — I10 ESSENTIAL HYPERTENSION: ICD-10-CM

## 2025-07-14 RX ORDER — LISINOPRIL 20 MG/1
20 TABLET ORAL DAILY
Qty: 90 TABLET | Refills: 1 | Status: SHIPPED | OUTPATIENT
Start: 2025-07-14 | End: 2026-01-10

## 2025-07-22 DIAGNOSIS — E78.2 MIXED HYPERLIPIDEMIA: ICD-10-CM

## 2025-07-22 RX ORDER — ROSUVASTATIN CALCIUM 40 MG/1
40 TABLET, COATED ORAL DAILY
Qty: 90 TABLET | Refills: 1 | Status: SHIPPED | OUTPATIENT
Start: 2025-07-22 | End: 2026-01-18

## 2025-07-27 LAB — NONINV COLON CA DNA+OCC BLD SCRN STL QL: POSITIVE

## 2025-08-12 ENCOUNTER — RESULTS FOLLOW-UP (OUTPATIENT)
Dept: PRIMARY CARE | Facility: CLINIC | Age: 60
End: 2025-08-12
Payer: COMMERCIAL

## 2025-08-12 DIAGNOSIS — R19.5 POSITIVE COLORECTAL CANCER SCREENING USING COLOGUARD TEST: Primary | ICD-10-CM

## 2025-08-13 ENCOUNTER — TELEPHONE (OUTPATIENT)
Facility: CLINIC | Age: 60
End: 2025-08-13
Payer: COMMERCIAL

## 2025-08-14 ENCOUNTER — APPOINTMENT (OUTPATIENT)
Facility: CLINIC | Age: 60
End: 2025-08-14
Payer: COMMERCIAL

## 2025-08-28 ENCOUNTER — HOSPITAL ENCOUNTER (OUTPATIENT)
Dept: GASTROENTEROLOGY | Facility: HOSPITAL | Age: 60
Discharge: HOME | End: 2025-08-28
Payer: COMMERCIAL

## 2025-08-28 ENCOUNTER — ANESTHESIA EVENT (OUTPATIENT)
Dept: GASTROENTEROLOGY | Facility: HOSPITAL | Age: 60
End: 2025-08-28
Payer: COMMERCIAL

## 2025-08-28 ENCOUNTER — ANESTHESIA (OUTPATIENT)
Dept: GASTROENTEROLOGY | Facility: HOSPITAL | Age: 60
End: 2025-08-28
Payer: COMMERCIAL

## 2025-08-28 VITALS
OXYGEN SATURATION: 97 % | SYSTOLIC BLOOD PRESSURE: 140 MMHG | DIASTOLIC BLOOD PRESSURE: 97 MMHG | RESPIRATION RATE: 16 BRPM | HEART RATE: 64 BPM | TEMPERATURE: 98 F

## 2025-08-28 DIAGNOSIS — R19.5 POSITIVE COLORECTAL CANCER SCREENING USING COLOGUARD TEST: ICD-10-CM

## 2025-08-28 PROCEDURE — 3700000001 HC GENERAL ANESTHESIA TIME - INITIAL BASE CHARGE

## 2025-08-28 PROCEDURE — 45380 COLONOSCOPY AND BIOPSY: CPT | Performed by: INTERNAL MEDICINE

## 2025-08-28 PROCEDURE — 7100000010 HC PHASE TWO TIME - EACH INCREMENTAL 1 MINUTE

## 2025-08-28 PROCEDURE — 45385 COLONOSCOPY W/LESION REMOVAL: CPT | Performed by: INTERNAL MEDICINE

## 2025-08-28 PROCEDURE — 3700000002 HC GENERAL ANESTHESIA TIME - EACH INCREMENTAL 1 MINUTE

## 2025-08-28 PROCEDURE — 7100000009 HC PHASE TWO TIME - INITIAL BASE CHARGE

## 2025-08-28 PROCEDURE — 2500000004 HC RX 250 GENERAL PHARMACY W/ HCPCS (ALT 636 FOR OP/ED): Performed by: INTERNAL MEDICINE

## 2025-08-28 PROCEDURE — 2500000004 HC RX 250 GENERAL PHARMACY W/ HCPCS (ALT 636 FOR OP/ED): Performed by: NURSE ANESTHETIST, CERTIFIED REGISTERED

## 2025-08-28 RX ORDER — SODIUM CHLORIDE 9 MG/ML
20 INJECTION, SOLUTION INTRAVENOUS CONTINUOUS
Status: ACTIVE | OUTPATIENT
Start: 2025-08-28 | End: 2025-08-28

## 2025-08-28 RX ORDER — PROPOFOL 10 MG/ML
INJECTION, EMULSION INTRAVENOUS AS NEEDED
Status: DISCONTINUED | OUTPATIENT
Start: 2025-08-28 | End: 2025-08-28

## 2025-08-28 RX ORDER — SODIUM CHLORIDE 9 MG/ML
20 INJECTION, SOLUTION INTRAVENOUS CONTINUOUS
Status: CANCELLED | OUTPATIENT
Start: 2025-08-28 | End: 2025-08-28

## 2025-08-28 RX ORDER — LIDOCAINE HCL/PF 100 MG/5ML
SYRINGE (ML) INTRAVENOUS AS NEEDED
Status: DISCONTINUED | OUTPATIENT
Start: 2025-08-28 | End: 2025-08-28

## 2025-08-28 RX ADMIN — LIDOCAINE HYDROCHLORIDE 100 MG: 20 INJECTION, SOLUTION INTRAVENOUS at 09:26

## 2025-08-28 RX ADMIN — SODIUM CHLORIDE 20 ML/HR: 0.9 INJECTION, SOLUTION INTRAVENOUS at 08:55

## 2025-08-28 RX ADMIN — PROPOFOL 300 MG: 10 INJECTION, EMULSION INTRAVENOUS at 09:26

## 2025-08-28 SDOH — HEALTH STABILITY: MENTAL HEALTH: CURRENT SMOKER: 0

## 2025-08-28 ASSESSMENT — PAIN SCALES - GENERAL
PAINLEVEL_OUTOF10: 0 - NO PAIN

## 2025-08-28 ASSESSMENT — PAIN - FUNCTIONAL ASSESSMENT
PAIN_FUNCTIONAL_ASSESSMENT: 0-10

## 2025-09-04 LAB
LABORATORY COMMENT REPORT: NORMAL
PATH REPORT.FINAL DX SPEC: NORMAL
PATH REPORT.GROSS SPEC: NORMAL
PATH REPORT.RELEVANT HX SPEC: NORMAL
PATH REPORT.TOTAL CANCER: NORMAL

## 2025-12-09 ENCOUNTER — APPOINTMENT (OUTPATIENT)
Dept: PRIMARY CARE | Facility: CLINIC | Age: 60
End: 2025-12-09
Payer: COMMERCIAL